# Patient Record
Sex: MALE | Race: WHITE | NOT HISPANIC OR LATINO | URBAN - METROPOLITAN AREA
[De-identification: names, ages, dates, MRNs, and addresses within clinical notes are randomized per-mention and may not be internally consistent; named-entity substitution may affect disease eponyms.]

---

## 2019-01-15 ENCOUNTER — EMERGENCY (EMERGENCY)
Facility: HOSPITAL | Age: 79
LOS: 1 days | Discharge: HOME | End: 2019-01-15
Attending: EMERGENCY MEDICINE
Payer: SELF-PAY

## 2019-01-15 VITALS
RESPIRATION RATE: 18 BRPM | DIASTOLIC BLOOD PRESSURE: 75 MMHG | WEIGHT: 253.97 LBS | SYSTOLIC BLOOD PRESSURE: 167 MMHG | HEIGHT: 65 IN | OXYGEN SATURATION: 97 % | TEMPERATURE: 98 F | HEART RATE: 92 BPM

## 2019-01-15 VITALS
TEMPERATURE: 98 F | RESPIRATION RATE: 18 BRPM | SYSTOLIC BLOOD PRESSURE: 126 MMHG | HEART RATE: 84 BPM | DIASTOLIC BLOOD PRESSURE: 67 MMHG | OXYGEN SATURATION: 96 %

## 2019-01-15 DIAGNOSIS — Z96.60 PRESENCE OF UNSPECIFIED ORTHOPEDIC JOINT IMPLANT: Chronic | ICD-10-CM

## 2019-01-15 DIAGNOSIS — Z95.5 PRESENCE OF CORONARY ANGIOPLASTY IMPLANT AND GRAFT: Chronic | ICD-10-CM

## 2019-01-15 PROCEDURE — 73590 X-RAY EXAM OF LOWER LEG: CPT | Mod: 26,RT

## 2019-01-15 PROCEDURE — 90471 IMMUNIZATION ADMIN: CPT

## 2019-01-15 PROCEDURE — 73590 X-RAY EXAM OF LOWER LEG: CPT

## 2019-01-15 PROCEDURE — 99283 EMERGENCY DEPT VISIT LOW MDM: CPT | Mod: 25

## 2019-01-15 PROCEDURE — 99283 EMERGENCY DEPT VISIT LOW MDM: CPT

## 2019-01-15 PROCEDURE — 90715 TDAP VACCINE 7 YRS/> IM: CPT

## 2019-01-15 RX ORDER — TETANUS TOXOID, REDUCED DIPHTHERIA TOXOID AND ACELLULAR PERTUSSIS VACCINE, ADSORBED 5; 2.5; 8; 8; 2.5 [IU]/.5ML; [IU]/.5ML; UG/.5ML; UG/.5ML; UG/.5ML
0.5 SUSPENSION INTRAMUSCULAR ONCE
Qty: 0 | Refills: 0 | Status: COMPLETED | OUTPATIENT
Start: 2019-01-15 | End: 2019-01-15

## 2019-01-15 RX ORDER — ACETAMINOPHEN 500 MG
975 TABLET ORAL ONCE
Qty: 0 | Refills: 0 | Status: COMPLETED | OUTPATIENT
Start: 2019-01-15 | End: 2019-01-15

## 2019-01-15 RX ADMIN — Medication 975 MILLIGRAM(S): at 17:16

## 2019-01-15 RX ADMIN — TETANUS TOXOID, REDUCED DIPHTHERIA TOXOID AND ACELLULAR PERTUSSIS VACCINE, ADSORBED 0.5 MILLILITER(S): 5; 2.5; 8; 8; 2.5 SUSPENSION INTRAMUSCULAR at 18:05
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
## 2019-01-15 NOTE — ED PROVIDER NOTE - CARE PROVIDER_API CALL
Huyen Amezcua (MD), Orthopaedic Surgery  [address] [place] [id number]  Phone: [phone]  Fax: [phone]
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
## 2019-07-16 ENCOUNTER — TRANSCRIPTION ENCOUNTER (OUTPATIENT)
Age: 79
End: 2019-07-16

## 2019-07-16 NOTE — ASU PATIENT PROFILE, ADULT - LANGUAGE ASSISTANCE NEEDED
Yes-Patient/Caregiver accepts free interpretation services.../patient additonally request that son Amir provide interpreation

## 2019-07-16 NOTE — ASU PATIENT PROFILE, ADULT - PMH
BPH (benign prostatic hyperplasia)    CAD (coronary artery disease)    Diabetes    Gout    Hyperlipidemia    Hypertension

## 2019-07-16 NOTE — ASU PATIENT PROFILE, ADULT - PSH
Fracture, ankle  left  S/P hip replacement  right  S/P primary angioplasty with coronary stent  4/2013- one stent

## 2019-07-17 ENCOUNTER — OUTPATIENT (OUTPATIENT)
Dept: OUTPATIENT SERVICES | Facility: HOSPITAL | Age: 79
LOS: 1 days | Discharge: HOME | End: 2019-07-17
Payer: MEDICARE

## 2019-07-17 VITALS
WEIGHT: 242.95 LBS | RESPIRATION RATE: 21 BRPM | DIASTOLIC BLOOD PRESSURE: 76 MMHG | TEMPERATURE: 99 F | OXYGEN SATURATION: 93 % | HEART RATE: 65 BPM | SYSTOLIC BLOOD PRESSURE: 128 MMHG | HEIGHT: 69 IN

## 2019-07-17 VITALS
DIASTOLIC BLOOD PRESSURE: 62 MMHG | RESPIRATION RATE: 17 BRPM | OXYGEN SATURATION: 96 % | SYSTOLIC BLOOD PRESSURE: 133 MMHG | HEART RATE: 60 BPM

## 2019-07-17 DIAGNOSIS — Z96.60 PRESENCE OF UNSPECIFIED ORTHOPEDIC JOINT IMPLANT: Chronic | ICD-10-CM

## 2019-07-17 DIAGNOSIS — H25.12 AGE-RELATED NUCLEAR CATARACT, LEFT EYE: ICD-10-CM

## 2019-07-17 DIAGNOSIS — S82.899A OTHER FRACTURE OF UNSPECIFIED LOWER LEG, INITIAL ENCOUNTER FOR CLOSED FRACTURE: Chronic | ICD-10-CM

## 2019-07-17 DIAGNOSIS — Z95.5 PRESENCE OF CORONARY ANGIOPLASTY IMPLANT AND GRAFT: Chronic | ICD-10-CM

## 2019-07-17 LAB — GLUCOSE BLDC GLUCOMTR-MCNC: 113 MG/DL — HIGH (ref 70–99)

## 2019-07-17 PROCEDURE — 66984 XCAPSL CTRC RMVL W/O ECP: CPT | Mod: LT

## 2019-07-17 PROCEDURE — V2632: CPT

## 2019-07-17 PROCEDURE — 82962 GLUCOSE BLOOD TEST: CPT

## 2019-07-17 NOTE — ASU DISCHARGE PLAN (ADULT/PEDIATRIC) - ASU DC SPECIAL INSTRUCTIONSFT
do not rub eye. tylenol for discomfort.  avoid advil motrin aleve aspirin to decrease chance of bleeding. eye kit given to pt.  Discharge and follow up  instructions explained to pt.  pt understands proper use of eye drops and instructions. [facility]

## 2019-07-17 NOTE — ASU DISCHARGE PLAN (ADULT/PEDIATRIC) - PATIENT EDUCATION MATERIALS PROVIED
Implant card (specify)/Other (specify)/Intraocular lens implant(IOL), Eye shield with instructions , sunglasses and eye kit given to patient.

## 2019-07-17 NOTE — ASU DISCHARGE PLAN (ADULT/PEDIATRIC) - CALL YOUR DOCTOR IF YOU HAVE ANY OF THE FOLLOWING:
Bleeding that does not stop/Swelling that gets worse/Nausea and vomiting that does not stop/Pain not relieved by Medications/Fever greater than (need to indicate Fahrenheit or Celsius) Bleeding that does not stop/Inability to tolerate liquids or foods/Nausea and vomiting that does not stop/Pain not relieved by Medications/Swelling that gets worse/Fever greater than (need to indicate Fahrenheit or Celsius)

## 2019-07-17 NOTE — ASU DISCHARGE PLAN (ADULT/PEDIATRIC) - CARE PROVIDER_API CALL
Ky Heaton)  Ophthalmology  [address] [place] [id number]  Phone: [phone]  Fax: [phone]  Follow Up Time:

## 2019-10-29 ENCOUNTER — TRANSCRIPTION ENCOUNTER (OUTPATIENT)
Age: 79
End: 2019-10-29

## 2019-10-29 PROBLEM — M10.9 GOUT, UNSPECIFIED: Chronic | Status: ACTIVE | Noted: 2019-07-17

## 2019-10-29 PROBLEM — N40.0 BENIGN PROSTATIC HYPERPLASIA WITHOUT LOWER URINARY TRACT SYMPTOMS: Chronic | Status: ACTIVE | Noted: 2019-07-17

## 2019-10-30 ENCOUNTER — OUTPATIENT (OUTPATIENT)
Dept: OUTPATIENT SERVICES | Facility: HOSPITAL | Age: 79
LOS: 1 days | End: 2019-10-30
Payer: MEDICARE

## 2019-10-30 VITALS
HEART RATE: 60 BPM | SYSTOLIC BLOOD PRESSURE: 141 MMHG | DIASTOLIC BLOOD PRESSURE: 74 MMHG | OXYGEN SATURATION: 95 % | HEIGHT: 65 IN | WEIGHT: 240.3 LBS | TEMPERATURE: 98 F | RESPIRATION RATE: 20 BRPM

## 2019-10-30 VITALS
OXYGEN SATURATION: 97 % | SYSTOLIC BLOOD PRESSURE: 133 MMHG | DIASTOLIC BLOOD PRESSURE: 59 MMHG | RESPIRATION RATE: 20 BRPM | HEART RATE: 63 BPM

## 2019-10-30 DIAGNOSIS — Z95.5 PRESENCE OF CORONARY ANGIOPLASTY IMPLANT AND GRAFT: Chronic | ICD-10-CM

## 2019-10-30 DIAGNOSIS — Z98.49 CATARACT EXTRACTION STATUS, UNSPECIFIED EYE: Chronic | ICD-10-CM

## 2019-10-30 DIAGNOSIS — S82.899A OTHER FRACTURE OF UNSPECIFIED LOWER LEG, INITIAL ENCOUNTER FOR CLOSED FRACTURE: Chronic | ICD-10-CM

## 2019-10-30 DIAGNOSIS — Z96.60 PRESENCE OF UNSPECIFIED ORTHOPEDIC JOINT IMPLANT: Chronic | ICD-10-CM

## 2019-10-30 DIAGNOSIS — H25.811 COMBINED FORMS OF AGE-RELATED CATARACT, RIGHT EYE: ICD-10-CM

## 2019-10-30 LAB — GLUCOSE BLDC GLUCOMTR-MCNC: 109 MG/DL — HIGH (ref 70–99)

## 2019-10-30 PROCEDURE — 66984 XCAPSL CTRC RMVL W/O ECP: CPT | Mod: RT

## 2019-10-30 PROCEDURE — V2632: CPT

## 2019-10-30 PROCEDURE — 82962 GLUCOSE BLOOD TEST: CPT

## 2019-10-30 NOTE — ASU PATIENT PROFILE, ADULT - LANGUAGE ASSISTANCE NEEDED
patient additonally request that son Amir provide interpreation/Yes-Patient/Caregiver accepts free interpretation services... patient additonally request that vera Oseguera assist with translation/Yes-Patient/Caregiver accepts free interpretation services...

## 2019-10-30 NOTE — ASU DISCHARGE PLAN (ADULT/PEDIATRIC) - CARE PROVIDER_API CALL
Ky Heaton)  Ophthalmology  [address] [place] [id number]  Phone: [phone]  Fax: [phone]  Follow Up Time:

## 2019-10-30 NOTE — ASU DISCHARGE PLAN (ADULT/PEDIATRIC) - CALL YOUR DOCTOR IF YOU HAVE ANY OF THE FOLLOWING:
Bleeding that does not stop/Pain not relieved by Medications/Swelling that gets worse/Wound/Surgical Site with redness, or foul smelling discharge or pus/Nausea and vomiting that does not stop/Fever greater than (need to indicate Fahrenheit or Celsius)

## 2019-10-30 NOTE — ASU PATIENT PROFILE, ADULT - PSH
Fracture, ankle  left  S/P hip replacement  right  S/P primary angioplasty with coronary stent  4/2013- one stent  Status post cataract extraction  left eye done on 7/17/2019

## 2022-01-26 ENCOUNTER — RESULT REVIEW (OUTPATIENT)
Age: 82
End: 2022-01-26

## 2022-06-23 ENCOUNTER — APPOINTMENT (OUTPATIENT)
Dept: UROLOGY | Facility: CLINIC | Age: 82
End: 2022-06-23
Payer: MEDICARE

## 2022-06-23 VITALS
SYSTOLIC BLOOD PRESSURE: 195 MMHG | HEART RATE: 86 BPM | DIASTOLIC BLOOD PRESSURE: 88 MMHG | TEMPERATURE: 98.7 F | OXYGEN SATURATION: 95 %

## 2022-06-23 DIAGNOSIS — N20.0 CALCULUS OF KIDNEY: ICD-10-CM

## 2022-06-23 DIAGNOSIS — N40.1 BENIGN PROSTATIC HYPERPLASIA WITH LOWER URINARY TRACT SYMPMS: ICD-10-CM

## 2022-06-23 DIAGNOSIS — N13.8 BENIGN PROSTATIC HYPERPLASIA WITH LOWER URINARY TRACT SYMPMS: ICD-10-CM

## 2022-06-23 PROCEDURE — 99204 OFFICE O/P NEW MOD 45 MIN: CPT

## 2022-06-23 PROCEDURE — 51798 US URINE CAPACITY MEASURE: CPT

## 2022-06-23 RX ORDER — HYDROCHLOROTHIAZIDE 25 MG/1
25 TABLET ORAL
Qty: 90 | Refills: 0 | Status: ACTIVE | COMMUNITY
Start: 2022-04-08

## 2022-06-23 RX ORDER — AMMONIUM LACTATE 12 %
12 CREAM (GRAM) TOPICAL
Qty: 280 | Refills: 0 | Status: ACTIVE | COMMUNITY
Start: 2022-03-03

## 2022-06-23 RX ORDER — DONEPEZIL HYDROCHLORIDE 5 MG/1
5 TABLET ORAL
Qty: 90 | Refills: 0 | Status: ACTIVE | COMMUNITY
Start: 2022-06-15

## 2022-06-23 RX ORDER — NAPROXEN 500 MG/1
500 TABLET ORAL
Qty: 30 | Refills: 0 | Status: ACTIVE | COMMUNITY
Start: 2022-04-08

## 2022-06-23 RX ORDER — SULFAMETHOXAZOLE AND TRIMETHOPRIM 800; 160 MG/1; MG/1
800-160 TABLET ORAL
Qty: 14 | Refills: 0 | Status: ACTIVE | COMMUNITY
Start: 2022-01-31

## 2022-06-23 RX ORDER — SEMAGLUTIDE 1.34 MG/ML
2 INJECTION, SOLUTION SUBCUTANEOUS
Qty: 2 | Refills: 0 | Status: ACTIVE | COMMUNITY
Start: 2022-06-09

## 2022-06-23 RX ORDER — BLOOD SUGAR DIAGNOSTIC
STRIP MISCELLANEOUS
Qty: 200 | Refills: 0 | Status: ACTIVE | COMMUNITY
Start: 2022-01-25

## 2022-06-23 RX ORDER — METOPROLOL SUCCINATE 100 MG/1
100 TABLET, EXTENDED RELEASE ORAL
Qty: 180 | Refills: 0 | Status: ACTIVE | COMMUNITY
Start: 2022-04-08

## 2022-06-23 RX ORDER — CLOPIDOGREL BISULFATE 75 MG/1
75 TABLET, FILM COATED ORAL
Qty: 90 | Refills: 0 | Status: ACTIVE | COMMUNITY
Start: 2022-04-08

## 2022-06-23 RX ORDER — LOSARTAN POTASSIUM 100 MG/1
100 TABLET, FILM COATED ORAL
Qty: 90 | Refills: 0 | Status: ACTIVE | COMMUNITY
Start: 2022-04-08

## 2022-06-23 RX ORDER — METOLAZONE 5 MG/1
5 TABLET ORAL
Qty: 90 | Refills: 0 | Status: ACTIVE | COMMUNITY
Start: 2022-06-09

## 2022-06-23 RX ORDER — OXYBUTYNIN CHLORIDE 5 MG/1
5 TABLET, EXTENDED RELEASE ORAL
Qty: 90 | Refills: 3 | Status: ACTIVE | COMMUNITY
Start: 2022-06-23 | End: 1900-01-01

## 2022-06-23 RX ORDER — TAMSULOSIN HYDROCHLORIDE 0.4 MG/1
0.4 CAPSULE ORAL
Qty: 90 | Refills: 0 | Status: ACTIVE | COMMUNITY
Start: 2022-06-15

## 2022-06-23 RX ORDER — HYDROXYZINE HYDROCHLORIDE 10 MG/1
10 TABLET ORAL
Qty: 90 | Refills: 0 | Status: ACTIVE | COMMUNITY
Start: 2022-04-08

## 2022-06-23 RX ORDER — GLYBURIDE AND METFORMIN HYDROCHLORIDE 5; 500 MG/1; MG/1
5-500 TABLET, FILM COATED ORAL
Qty: 360 | Refills: 0 | Status: ACTIVE | COMMUNITY
Start: 2022-04-08

## 2022-06-23 RX ORDER — ALLOPURINOL 300 MG/1
300 TABLET ORAL
Qty: 90 | Refills: 0 | Status: ACTIVE | COMMUNITY
Start: 2022-04-08

## 2022-06-23 RX ORDER — GABAPENTIN 300 MG/1
300 CAPSULE ORAL
Qty: 90 | Refills: 0 | Status: ACTIVE | COMMUNITY
Start: 2022-04-08

## 2022-06-23 RX ORDER — INSULIN DEGLUDEC INJECTION 200 U/ML
200 INJECTION, SOLUTION SUBCUTANEOUS
Qty: 27 | Refills: 0 | Status: ACTIVE | COMMUNITY
Start: 2022-06-11

## 2022-06-23 RX ORDER — TAMSULOSIN HYDROCHLORIDE 0.4 MG/1
0.4 CAPSULE ORAL
Qty: 90 | Refills: 3 | Status: ACTIVE | COMMUNITY
Start: 2022-06-23 | End: 1900-01-01

## 2022-06-23 RX ORDER — ESOMEPRAZOLE MAGNESIUM 40 MG/1
40 CAPSULE, DELAYED RELEASE ORAL
Qty: 90 | Refills: 0 | Status: ACTIVE | COMMUNITY
Start: 2022-04-08

## 2022-06-23 RX ORDER — DICLOFENAC SODIUM 1% 10 MG/G
1 GEL TOPICAL
Qty: 100 | Refills: 0 | Status: ACTIVE | COMMUNITY
Start: 2022-06-09

## 2022-06-23 RX ORDER — TRIAMCINOLONE ACETONIDE 0.25 MG/G
0.03 CREAM TOPICAL
Qty: 45 | Refills: 0 | Status: ACTIVE | COMMUNITY
Start: 2022-05-10

## 2022-06-23 RX ORDER — LANCETS
EACH MISCELLANEOUS
Qty: 200 | Refills: 0 | Status: ACTIVE | COMMUNITY
Start: 2022-01-25

## 2022-06-23 NOTE — HISTORY OF PRESENT ILLNESS
[Urinary Incontinence] : urinary incontinence [Urinary Urgency] : urinary urgency [FreeTextEntry1] : CHETNA MENDOZA is a 82 year M who presents for ~ 2 year history of urinary incontinence, requiring diapers. Leaks when he's walking, sitting- wears a diaper outside, but does have some recognized urge when home that he can rush to the bathroom for.\par \par No dysuria, no hematuria. No flank or abdominal pain.\par \par PMH: dm, gout, htn, CAD, kidney stones\par PSH: hip replacement, skin cancer removal\par FH: no sig FH gu issues\par Meds: mult- see full list, reviewed\par SH: never a smoker\par Allergies: nkda\par

## 2022-06-23 NOTE — ASSESSMENT
[FreeTextEntry1] : No urge, unable to provide urine at this time.\par \par Bladder scan: 36 cc random value volume;\par \par I had a long discussion with the patient about his voiding symptoms, bladder emptying, and medical  treatments for BP and OAB, and further evaluation with cystoscopy. Also, need to obtain u/a, culture which he can do as outpt from home.\par \par Based on his symptoms, exam, lab values, and procedural findings, I've recommended:\par \par The pt desires to proceed with: tamsulosin for any BPH and to maximize emptying, and oxybutynin 5 mg ER to start  ~ 1 week later, both once day\par \par Will arrange followup as follows-\par \par 1. RTC 3 weeks with u/a, bladder scan\par 2. u/a, C&S when able

## 2022-06-23 NOTE — PHYSICAL EXAM
[General Appearance - Well Developed] : well developed [General Appearance - Well Nourished] : well nourished [Normal Appearance] : normal appearance [Well Groomed] : well groomed [General Appearance - In No Acute Distress] : no acute distress [Edema] : no peripheral edema [Exaggerated Use Of Accessory Muscles For Inspiration] : no accessory muscle use [Abdomen Soft] : soft [Abdomen Tenderness] : non-tender [Costovertebral Angle Tenderness] : no ~M costovertebral angle tenderness [Urethral Meatus] : meatus normal [Penis Abnormality] : normal circumcised penis [Urinary Bladder Findings] : the bladder was normal on palpation [Epididymis] : the epididymides were normal [Testes Mass (___cm)] : there were no testicular masses [Normal Station and Gait] : the gait and station were normal for the patient's age [FreeTextEntry1] : needs walker assist [] : no rash [No Focal Deficits] : no focal deficits [Oriented To Time, Place, And Person] : oriented to person, place, and time [Affect] : the affect was normal [Mood] : the mood was normal [Not Anxious] : not anxious

## 2022-06-25 LAB
APPEARANCE: ABNORMAL
BACTERIA: ABNORMAL
BILIRUBIN URINE: NEGATIVE
BLOOD URINE: NEGATIVE
COLOR: YELLOW
GLUCOSE QUALITATIVE U: NEGATIVE
HYALINE CASTS: 0 /LPF
KETONES URINE: NEGATIVE
LEUKOCYTE ESTERASE URINE: NEGATIVE
MICROSCOPIC-UA: NORMAL
NITRITE URINE: NEGATIVE
PH URINE: 6.5
PROTEIN URINE: ABNORMAL
RED BLOOD CELLS URINE: 2 /HPF
SPECIFIC GRAVITY URINE: 1.02
SQUAMOUS EPITHELIAL CELLS: 0 /HPF
UROBILINOGEN URINE: NORMAL
WHITE BLOOD CELLS URINE: 1 /HPF

## 2022-06-28 LAB — BACTERIA UR CULT: NORMAL

## 2022-07-14 ENCOUNTER — APPOINTMENT (OUTPATIENT)
Dept: UROLOGY | Facility: CLINIC | Age: 82
End: 2022-07-14

## 2022-07-14 DIAGNOSIS — R32 UNSPECIFIED URINARY INCONTINENCE: ICD-10-CM

## 2022-07-14 DIAGNOSIS — N32.81 OVERACTIVE BLADDER: ICD-10-CM

## 2023-06-03 ENCOUNTER — INPATIENT (INPATIENT)
Facility: HOSPITAL | Age: 83
LOS: 5 days | Discharge: NURSING, REHAB OR LONG-TERM CARE | DRG: 602 | End: 2023-06-09
Attending: INTERNAL MEDICINE | Admitting: STUDENT IN AN ORGANIZED HEALTH CARE EDUCATION/TRAINING PROGRAM
Payer: MEDICARE

## 2023-06-03 VITALS
SYSTOLIC BLOOD PRESSURE: 150 MMHG | TEMPERATURE: 98 F | WEIGHT: 244.93 LBS | OXYGEN SATURATION: 94 % | HEIGHT: 68 IN | DIASTOLIC BLOOD PRESSURE: 61 MMHG | RESPIRATION RATE: 22 BRPM | HEART RATE: 71 BPM

## 2023-06-03 DIAGNOSIS — Z98.49 CATARACT EXTRACTION STATUS, UNSPECIFIED EYE: Chronic | ICD-10-CM

## 2023-06-03 DIAGNOSIS — L03.90 CELLULITIS, UNSPECIFIED: ICD-10-CM

## 2023-06-03 DIAGNOSIS — Z96.60 PRESENCE OF UNSPECIFIED ORTHOPEDIC JOINT IMPLANT: Chronic | ICD-10-CM

## 2023-06-03 DIAGNOSIS — Z95.5 PRESENCE OF CORONARY ANGIOPLASTY IMPLANT AND GRAFT: Chronic | ICD-10-CM

## 2023-06-03 DIAGNOSIS — S82.899A OTHER FRACTURE OF UNSPECIFIED LOWER LEG, INITIAL ENCOUNTER FOR CLOSED FRACTURE: Chronic | ICD-10-CM

## 2023-06-03 LAB
ALBUMIN SERPL ELPH-MCNC: 4.4 G/DL — SIGNIFICANT CHANGE UP (ref 3.3–5)
ALP SERPL-CCNC: 107 U/L — SIGNIFICANT CHANGE UP (ref 40–120)
ALT FLD-CCNC: 33 U/L — SIGNIFICANT CHANGE UP (ref 10–45)
ANION GAP SERPL CALC-SCNC: 11 MMOL/L — SIGNIFICANT CHANGE UP (ref 5–17)
AST SERPL-CCNC: 28 U/L — SIGNIFICANT CHANGE UP (ref 10–40)
BASOPHILS # BLD AUTO: 0.07 K/UL — SIGNIFICANT CHANGE UP (ref 0–0.2)
BASOPHILS NFR BLD AUTO: 0.8 % — SIGNIFICANT CHANGE UP (ref 0–2)
BILIRUB SERPL-MCNC: 0.2 MG/DL — SIGNIFICANT CHANGE UP (ref 0.2–1.2)
BUN SERPL-MCNC: 17 MG/DL — SIGNIFICANT CHANGE UP (ref 7–23)
CALCIUM SERPL-MCNC: 9.8 MG/DL — SIGNIFICANT CHANGE UP (ref 8.4–10.5)
CHLORIDE SERPL-SCNC: 99 MMOL/L — SIGNIFICANT CHANGE UP (ref 96–108)
CO2 SERPL-SCNC: 29 MMOL/L — SIGNIFICANT CHANGE UP (ref 22–31)
CREAT SERPL-MCNC: 0.79 MG/DL — SIGNIFICANT CHANGE UP (ref 0.5–1.3)
EGFR: 88 ML/MIN/1.73M2 — SIGNIFICANT CHANGE UP
EOSINOPHIL # BLD AUTO: 0.28 K/UL — SIGNIFICANT CHANGE UP (ref 0–0.5)
EOSINOPHIL NFR BLD AUTO: 3.3 % — SIGNIFICANT CHANGE UP (ref 0–6)
FLUAV AG NPH QL: SIGNIFICANT CHANGE UP
FLUBV AG NPH QL: SIGNIFICANT CHANGE UP
GLUCOSE SERPL-MCNC: 96 MG/DL — SIGNIFICANT CHANGE UP (ref 70–99)
HCT VFR BLD CALC: 37.7 % — LOW (ref 39–50)
HGB BLD-MCNC: 12.6 G/DL — LOW (ref 13–17)
IMM GRANULOCYTES NFR BLD AUTO: 0.6 % — SIGNIFICANT CHANGE UP (ref 0–0.9)
LYMPHOCYTES # BLD AUTO: 2.51 K/UL — SIGNIFICANT CHANGE UP (ref 1–3.3)
LYMPHOCYTES # BLD AUTO: 29.5 % — SIGNIFICANT CHANGE UP (ref 13–44)
MCHC RBC-ENTMCNC: 32.8 PG — SIGNIFICANT CHANGE UP (ref 27–34)
MCHC RBC-ENTMCNC: 33.4 GM/DL — SIGNIFICANT CHANGE UP (ref 32–36)
MCV RBC AUTO: 98.2 FL — SIGNIFICANT CHANGE UP (ref 80–100)
MONOCYTES # BLD AUTO: 0.85 K/UL — SIGNIFICANT CHANGE UP (ref 0–0.9)
MONOCYTES NFR BLD AUTO: 10 % — SIGNIFICANT CHANGE UP (ref 2–14)
NEUTROPHILS # BLD AUTO: 4.74 K/UL — SIGNIFICANT CHANGE UP (ref 1.8–7.4)
NEUTROPHILS NFR BLD AUTO: 55.8 % — SIGNIFICANT CHANGE UP (ref 43–77)
NRBC # BLD: 0 /100 WBCS — SIGNIFICANT CHANGE UP (ref 0–0)
NT-PROBNP SERPL-SCNC: <36 PG/ML — SIGNIFICANT CHANGE UP (ref 0–300)
PLATELET # BLD AUTO: 269 K/UL — SIGNIFICANT CHANGE UP (ref 150–400)
POTASSIUM SERPL-MCNC: 4.3 MMOL/L — SIGNIFICANT CHANGE UP (ref 3.5–5.3)
POTASSIUM SERPL-SCNC: 4.3 MMOL/L — SIGNIFICANT CHANGE UP (ref 3.5–5.3)
PROT SERPL-MCNC: 7.8 G/DL — SIGNIFICANT CHANGE UP (ref 6–8.3)
RBC # BLD: 3.84 M/UL — LOW (ref 4.2–5.8)
RBC # FLD: 13.1 % — SIGNIFICANT CHANGE UP (ref 10.3–14.5)
RSV RNA NPH QL NAA+NON-PROBE: SIGNIFICANT CHANGE UP
SARS-COV-2 RNA SPEC QL NAA+PROBE: SIGNIFICANT CHANGE UP
SODIUM SERPL-SCNC: 139 MMOL/L — SIGNIFICANT CHANGE UP (ref 135–145)
TROPONIN T, HIGH SENSITIVITY RESULT: 25 NG/L — SIGNIFICANT CHANGE UP (ref 0–51)
WBC # BLD: 8.5 K/UL — SIGNIFICANT CHANGE UP (ref 3.8–10.5)
WBC # FLD AUTO: 8.5 K/UL — SIGNIFICANT CHANGE UP (ref 3.8–10.5)

## 2023-06-03 PROCEDURE — 71045 X-RAY EXAM CHEST 1 VIEW: CPT | Mod: 26

## 2023-06-03 PROCEDURE — 99285 EMERGENCY DEPT VISIT HI MDM: CPT

## 2023-06-03 RX ORDER — CEFTRIAXONE 500 MG/1
1000 INJECTION, POWDER, FOR SOLUTION INTRAMUSCULAR; INTRAVENOUS ONCE
Refills: 0 | Status: COMPLETED | OUTPATIENT
Start: 2023-06-03 | End: 2023-06-03

## 2023-06-03 RX ADMIN — CEFTRIAXONE 100 MILLIGRAM(S): 500 INJECTION, POWDER, FOR SOLUTION INTRAMUSCULAR; INTRAVENOUS at 22:42

## 2023-06-03 NOTE — ED PROVIDER NOTE - NSICDXPASTSURGICALHX_GEN_ALL_CORE_FT
PAST SURGICAL HISTORY:  Fracture, ankle left    S/P hip replacement right    S/P primary angioplasty with coronary stent 4/2013- one stent    Status post cataract extraction left eye done on 7/17/2019

## 2023-06-03 NOTE — ED PROVIDER NOTE - NSICDXPASTMEDICALHX_GEN_ALL_CORE_FT
PAST MEDICAL HISTORY:  BPH (benign prostatic hyperplasia)     CAD (coronary artery disease)     Diabetes     Gout     Hyperlipidemia     Hypertension

## 2023-06-03 NOTE — ED ADULT NURSE REASSESSMENT NOTE - NS ED NURSE REASSESS COMMENT FT1
report received from THUY wade. pt admitted for antibiotics. pt A&Ox4 and vss. as per MD pt ok ot eat provided snacks

## 2023-06-03 NOTE — ED PROVIDER NOTE - CARE PLAN
1 Principal Discharge DX:	Cellulitis  Secondary Diagnosis:	Lower extremity edema  Secondary Diagnosis:	Lower extremity edema   FAMILY HISTORY:  Father  Still living? Unknown  Family history of heart attack, Age at diagnosis: Age Unknown    Mother  Still living? Unknown  Family history of Alzheimer's disease, Age at diagnosis: Age Unknown

## 2023-06-03 NOTE — ED ADULT TRIAGE NOTE - CHIEF COMPLAINT QUOTE
right le redness and swelling; no trauma; primary doctor gave ointment 3 days ago; not getting better

## 2023-06-03 NOTE — ED PROVIDER NOTE - NS ED MD DISPO ADMITTING SERVICE
Pt does not report following any specialized diet PTA. Pt does not report following any specialized diet PTA. Drinks Ensure occasionally when she skips a meal MED

## 2023-06-03 NOTE — ED PROVIDER NOTE - PROGRESS NOTE DETAILS
Patient reevaluated, labs remarkable for normal CBC, normal CMP and negative COVID.  Chest x-ray with mild bilateral pulm edema.  In the setting of lower extremity edema, concern for new onset heart failure.  No fluid overload currently will defer diuretics.  Discussed with hospitalist, will admit patient to telemetry.  Antibiotics ordered for lower extremity cellulitis

## 2023-06-03 NOTE — ED PROCEDURE NOTE - ATTENDING CONTRIBUTION TO CARE
Emergency Department Focused Ultrasound performed at patient's bedside for educational purposes. The study will have a follow up study performed or was performed in the direct supervision of an ultrasound trained attending.     near normal EF, no pericard effusion, no b lines , poor echo windows
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
## 2023-06-03 NOTE — ED PROVIDER NOTE - OBJECTIVE STATEMENT
83-year-old male Kyrgyz speaking history of diabetes on oral as well as insulin medication presenting with right lower extremity redness and bilateral lower extremity swelling son is accompanying him and says that he has been not feeling well for past few days and has been feeling short of breath progressively denies chest pain right now or palpitations denies recent antibiotic use.  Noticed some weeping from the small ulcer that he has over the anterior shin of the right leg denies any fever chills no nausea vomiting and no change in bowel habits or urinary symptoms

## 2023-06-03 NOTE — ED PROVIDER NOTE - PHYSICAL EXAMINATION
Well-appearing mild respiratory distress and tachypnea  Neuro exam grossly intact hard of hearing  Clear lungs  S1-S2  Soft nontender abdomen  Well-perfused  Bilateral pitting edema of both legs up to the knee right slightly more than left as well as an area of erythema in the mid anterior right leg with a central wound slightly warm and tender to touch

## 2023-06-03 NOTE — ED ADULT NURSE NOTE - OBJECTIVE STATEMENT
84 yo M pt hx of HLD, DM, HTN with son at bedside Farsi speaking p/w a few days of B/L LE edema and wound to R shin. pt seen by PCP started on Santyl ointment with minimal relief.  pt A&Ox4, BOOGIE, skin warm dry with b/l LE edema +3 pitting, RLE erythematous around wound and warm to touch, PMS intact.  Pt denies headache, dizziness, chest pain, palpitations, cough, SOB, abdominal pain, n/v/d, urinary symptoms, fevers, chills, weakness at this time.

## 2023-06-04 ENCOUNTER — TRANSCRIPTION ENCOUNTER (OUTPATIENT)
Age: 83
End: 2023-06-04

## 2023-06-04 DIAGNOSIS — I25.10 ATHEROSCLEROTIC HEART DISEASE OF NATIVE CORONARY ARTERY WITHOUT ANGINA PECTORIS: ICD-10-CM

## 2023-06-04 DIAGNOSIS — E03.9 HYPOTHYROIDISM, UNSPECIFIED: ICD-10-CM

## 2023-06-04 DIAGNOSIS — M79.89 OTHER SPECIFIED SOFT TISSUE DISORDERS: ICD-10-CM

## 2023-06-04 DIAGNOSIS — Z29.9 ENCOUNTER FOR PROPHYLACTIC MEASURES, UNSPECIFIED: ICD-10-CM

## 2023-06-04 DIAGNOSIS — E11.9 TYPE 2 DIABETES MELLITUS WITHOUT COMPLICATIONS: ICD-10-CM

## 2023-06-04 DIAGNOSIS — M19.90 UNSPECIFIED OSTEOARTHRITIS, UNSPECIFIED SITE: ICD-10-CM

## 2023-06-04 DIAGNOSIS — I10 ESSENTIAL (PRIMARY) HYPERTENSION: ICD-10-CM

## 2023-06-04 DIAGNOSIS — L03.90 CELLULITIS, UNSPECIFIED: ICD-10-CM

## 2023-06-04 LAB
A1C WITH ESTIMATED AVERAGE GLUCOSE RESULT: 8.8 % — HIGH (ref 4–5.6)
ALBUMIN SERPL ELPH-MCNC: 3.9 G/DL — SIGNIFICANT CHANGE UP (ref 3.3–5)
ALP SERPL-CCNC: 95 U/L — SIGNIFICANT CHANGE UP (ref 40–120)
ALT FLD-CCNC: 28 U/L — SIGNIFICANT CHANGE UP (ref 10–45)
ANION GAP SERPL CALC-SCNC: 15 MMOL/L — SIGNIFICANT CHANGE UP (ref 5–17)
AST SERPL-CCNC: 24 U/L — SIGNIFICANT CHANGE UP (ref 10–40)
BASOPHILS # BLD AUTO: 0.06 K/UL — SIGNIFICANT CHANGE UP (ref 0–0.2)
BASOPHILS NFR BLD AUTO: 0.8 % — SIGNIFICANT CHANGE UP (ref 0–2)
BILIRUB SERPL-MCNC: 0.3 MG/DL — SIGNIFICANT CHANGE UP (ref 0.2–1.2)
BUN SERPL-MCNC: 15 MG/DL — SIGNIFICANT CHANGE UP (ref 7–23)
CALCIUM SERPL-MCNC: 9 MG/DL — SIGNIFICANT CHANGE UP (ref 8.4–10.5)
CHLORIDE SERPL-SCNC: 98 MMOL/L — SIGNIFICANT CHANGE UP (ref 96–108)
CO2 SERPL-SCNC: 26 MMOL/L — SIGNIFICANT CHANGE UP (ref 22–31)
CREAT SERPL-MCNC: 0.68 MG/DL — SIGNIFICANT CHANGE UP (ref 0.5–1.3)
EGFR: 92 ML/MIN/1.73M2 — SIGNIFICANT CHANGE UP
EOSINOPHIL # BLD AUTO: 0.36 K/UL — SIGNIFICANT CHANGE UP (ref 0–0.5)
EOSINOPHIL NFR BLD AUTO: 4.5 % — SIGNIFICANT CHANGE UP (ref 0–6)
ESTIMATED AVERAGE GLUCOSE: 206 MG/DL — HIGH (ref 68–114)
GLUCOSE BLDC GLUCOMTR-MCNC: 102 MG/DL — HIGH (ref 70–99)
GLUCOSE BLDC GLUCOMTR-MCNC: 126 MG/DL — HIGH (ref 70–99)
GLUCOSE BLDC GLUCOMTR-MCNC: 135 MG/DL — HIGH (ref 70–99)
GLUCOSE BLDC GLUCOMTR-MCNC: 157 MG/DL — HIGH (ref 70–99)
GLUCOSE BLDC GLUCOMTR-MCNC: 190 MG/DL — HIGH (ref 70–99)
GLUCOSE SERPL-MCNC: 110 MG/DL — HIGH (ref 70–99)
HCT VFR BLD CALC: 36.4 % — LOW (ref 39–50)
HGB BLD-MCNC: 12 G/DL — LOW (ref 13–17)
IMM GRANULOCYTES NFR BLD AUTO: 0.6 % — SIGNIFICANT CHANGE UP (ref 0–0.9)
LYMPHOCYTES # BLD AUTO: 2.36 K/UL — SIGNIFICANT CHANGE UP (ref 1–3.3)
LYMPHOCYTES # BLD AUTO: 29.8 % — SIGNIFICANT CHANGE UP (ref 13–44)
MAGNESIUM SERPL-MCNC: 1.6 MG/DL — SIGNIFICANT CHANGE UP (ref 1.6–2.6)
MCHC RBC-ENTMCNC: 32.6 PG — SIGNIFICANT CHANGE UP (ref 27–34)
MCHC RBC-ENTMCNC: 33 GM/DL — SIGNIFICANT CHANGE UP (ref 32–36)
MCV RBC AUTO: 98.9 FL — SIGNIFICANT CHANGE UP (ref 80–100)
MONOCYTES # BLD AUTO: 0.77 K/UL — SIGNIFICANT CHANGE UP (ref 0–0.9)
MONOCYTES NFR BLD AUTO: 9.7 % — SIGNIFICANT CHANGE UP (ref 2–14)
MRSA PCR RESULT.: SIGNIFICANT CHANGE UP
NEUTROPHILS # BLD AUTO: 4.33 K/UL — SIGNIFICANT CHANGE UP (ref 1.8–7.4)
NEUTROPHILS NFR BLD AUTO: 54.6 % — SIGNIFICANT CHANGE UP (ref 43–77)
NRBC # BLD: 0 /100 WBCS — SIGNIFICANT CHANGE UP (ref 0–0)
PHOSPHATE SERPL-MCNC: 3.7 MG/DL — SIGNIFICANT CHANGE UP (ref 2.5–4.5)
PLATELET # BLD AUTO: 247 K/UL — SIGNIFICANT CHANGE UP (ref 150–400)
POTASSIUM SERPL-MCNC: 3.7 MMOL/L — SIGNIFICANT CHANGE UP (ref 3.5–5.3)
POTASSIUM SERPL-SCNC: 3.7 MMOL/L — SIGNIFICANT CHANGE UP (ref 3.5–5.3)
PROT SERPL-MCNC: 7 G/DL — SIGNIFICANT CHANGE UP (ref 6–8.3)
RBC # BLD: 3.68 M/UL — LOW (ref 4.2–5.8)
RBC # FLD: 13.1 % — SIGNIFICANT CHANGE UP (ref 10.3–14.5)
S AUREUS DNA NOSE QL NAA+PROBE: SIGNIFICANT CHANGE UP
SODIUM SERPL-SCNC: 139 MMOL/L — SIGNIFICANT CHANGE UP (ref 135–145)
WBC # BLD: 7.93 K/UL — SIGNIFICANT CHANGE UP (ref 3.8–10.5)
WBC # FLD AUTO: 7.93 K/UL — SIGNIFICANT CHANGE UP (ref 3.8–10.5)

## 2023-06-04 PROCEDURE — 73590 X-RAY EXAM OF LOWER LEG: CPT | Mod: 26,RT

## 2023-06-04 PROCEDURE — 93970 EXTREMITY STUDY: CPT | Mod: 26

## 2023-06-04 PROCEDURE — 12345: CPT | Mod: NC,GC

## 2023-06-04 PROCEDURE — 99223 1ST HOSP IP/OBS HIGH 75: CPT | Mod: GC

## 2023-06-04 RX ORDER — HEPARIN SODIUM 5000 [USP'U]/ML
5000 INJECTION INTRAVENOUS; SUBCUTANEOUS EVERY 8 HOURS
Refills: 0 | Status: DISCONTINUED | OUTPATIENT
Start: 2023-06-04 | End: 2023-06-09

## 2023-06-04 RX ORDER — INSULIN LISPRO 100/ML
VIAL (ML) SUBCUTANEOUS
Refills: 0 | Status: DISCONTINUED | OUTPATIENT
Start: 2023-06-04 | End: 2023-06-09

## 2023-06-04 RX ORDER — INSULIN LISPRO 100/ML
VIAL (ML) SUBCUTANEOUS AT BEDTIME
Refills: 0 | Status: DISCONTINUED | OUTPATIENT
Start: 2023-06-04 | End: 2023-06-09

## 2023-06-04 RX ORDER — SODIUM CHLORIDE 9 MG/ML
1000 INJECTION, SOLUTION INTRAVENOUS
Refills: 0 | Status: DISCONTINUED | OUTPATIENT
Start: 2023-06-04 | End: 2023-06-09

## 2023-06-04 RX ORDER — FUROSEMIDE 40 MG
20 TABLET ORAL DAILY
Refills: 0 | Status: COMPLETED | OUTPATIENT
Start: 2023-06-04 | End: 2023-06-06

## 2023-06-04 RX ORDER — ASPIRIN/CALCIUM CARB/MAGNESIUM 324 MG
81 TABLET ORAL DAILY
Refills: 0 | Status: DISCONTINUED | OUTPATIENT
Start: 2023-06-04 | End: 2023-06-09

## 2023-06-04 RX ORDER — CLOPIDOGREL BISULFATE 75 MG/1
75 TABLET, FILM COATED ORAL DAILY
Refills: 0 | Status: DISCONTINUED | OUTPATIENT
Start: 2023-06-04 | End: 2023-06-06

## 2023-06-04 RX ORDER — FUROSEMIDE 40 MG
20 TABLET ORAL ONCE
Refills: 0 | Status: DISCONTINUED | OUTPATIENT
Start: 2023-06-04 | End: 2023-06-04

## 2023-06-04 RX ORDER — CHOLECALCIFEROL (VITAMIN D3) 125 MCG
1 CAPSULE ORAL
Refills: 0 | DISCHARGE

## 2023-06-04 RX ORDER — PANTOPRAZOLE SODIUM 20 MG/1
40 TABLET, DELAYED RELEASE ORAL
Refills: 0 | Status: DISCONTINUED | OUTPATIENT
Start: 2023-06-04 | End: 2023-06-09

## 2023-06-04 RX ORDER — METOPROLOL TARTRATE 50 MG
100 TABLET ORAL DAILY
Refills: 0 | Status: DISCONTINUED | OUTPATIENT
Start: 2023-06-04 | End: 2023-06-09

## 2023-06-04 RX ORDER — INSULIN GLARGINE 100 [IU]/ML
60 INJECTION, SOLUTION SUBCUTANEOUS AT BEDTIME
Refills: 0 | Status: DISCONTINUED | OUTPATIENT
Start: 2023-06-04 | End: 2023-06-08

## 2023-06-04 RX ORDER — INSULIN GLARGINE 100 [IU]/ML
60 INJECTION, SOLUTION SUBCUTANEOUS
Refills: 0 | DISCHARGE

## 2023-06-04 RX ORDER — ESOMEPRAZOLE MAGNESIUM 40 MG/1
1 CAPSULE, DELAYED RELEASE ORAL
Refills: 0 | DISCHARGE

## 2023-06-04 RX ORDER — HYDROXYZINE HCL 10 MG
10 TABLET ORAL DAILY
Refills: 0 | Status: DISCONTINUED | OUTPATIENT
Start: 2023-06-04 | End: 2023-06-06

## 2023-06-04 RX ORDER — GLYBURIDE-METFORMIN HYDROCHLORIDE 1.25; 25 MG/1; MG/1
1 TABLET ORAL
Refills: 0 | DISCHARGE

## 2023-06-04 RX ORDER — CEFTRIAXONE 500 MG/1
1000 INJECTION, POWDER, FOR SOLUTION INTRAMUSCULAR; INTRAVENOUS EVERY 24 HOURS
Refills: 0 | Status: DISCONTINUED | OUTPATIENT
Start: 2023-06-04 | End: 2023-06-09

## 2023-06-04 RX ORDER — SENNA PLUS 8.6 MG/1
2 TABLET ORAL AT BEDTIME
Refills: 0 | Status: DISCONTINUED | OUTPATIENT
Start: 2023-06-04 | End: 2023-06-09

## 2023-06-04 RX ORDER — DONEPEZIL HYDROCHLORIDE 10 MG/1
5 TABLET, FILM COATED ORAL AT BEDTIME
Refills: 0 | Status: DISCONTINUED | OUTPATIENT
Start: 2023-06-04 | End: 2023-06-09

## 2023-06-04 RX ORDER — ACETAMINOPHEN 500 MG
650 TABLET ORAL EVERY 6 HOURS
Refills: 0 | Status: COMPLETED | OUTPATIENT
Start: 2023-06-04 | End: 2023-06-04

## 2023-06-04 RX ORDER — DEXTROSE 50 % IN WATER 50 %
25 SYRINGE (ML) INTRAVENOUS ONCE
Refills: 0 | Status: DISCONTINUED | OUTPATIENT
Start: 2023-06-04 | End: 2023-06-09

## 2023-06-04 RX ORDER — LOSARTAN POTASSIUM 100 MG/1
100 TABLET, FILM COATED ORAL DAILY
Refills: 0 | Status: DISCONTINUED | OUTPATIENT
Start: 2023-06-04 | End: 2023-06-09

## 2023-06-04 RX ORDER — CHOLECALCIFEROL (VITAMIN D3) 125 MCG
1000 CAPSULE ORAL DAILY
Refills: 0 | Status: DISCONTINUED | OUTPATIENT
Start: 2023-06-04 | End: 2023-06-09

## 2023-06-04 RX ORDER — GABAPENTIN 400 MG/1
300 CAPSULE ORAL AT BEDTIME
Refills: 0 | Status: DISCONTINUED | OUTPATIENT
Start: 2023-06-04 | End: 2023-06-09

## 2023-06-04 RX ORDER — OXYCODONE HYDROCHLORIDE 5 MG/1
5 TABLET ORAL EVERY 6 HOURS
Refills: 0 | Status: DISCONTINUED | OUTPATIENT
Start: 2023-06-04 | End: 2023-06-09

## 2023-06-04 RX ORDER — DEXTROSE 50 % IN WATER 50 %
15 SYRINGE (ML) INTRAVENOUS ONCE
Refills: 0 | Status: DISCONTINUED | OUTPATIENT
Start: 2023-06-04 | End: 2023-06-09

## 2023-06-04 RX ORDER — GLUCAGON INJECTION, SOLUTION 0.5 MG/.1ML
1 INJECTION, SOLUTION SUBCUTANEOUS ONCE
Refills: 0 | Status: DISCONTINUED | OUTPATIENT
Start: 2023-06-04 | End: 2023-06-09

## 2023-06-04 RX ORDER — ALLOPURINOL 300 MG
300 TABLET ORAL DAILY
Refills: 0 | Status: DISCONTINUED | OUTPATIENT
Start: 2023-06-04 | End: 2023-06-09

## 2023-06-04 RX ORDER — INSULIN GLARGINE 100 [IU]/ML
75 INJECTION, SOLUTION SUBCUTANEOUS
Refills: 0 | DISCHARGE

## 2023-06-04 RX ORDER — DEXTROSE 50 % IN WATER 50 %
12.5 SYRINGE (ML) INTRAVENOUS ONCE
Refills: 0 | Status: DISCONTINUED | OUTPATIENT
Start: 2023-06-04 | End: 2023-06-09

## 2023-06-04 RX ADMIN — HEPARIN SODIUM 5000 UNIT(S): 5000 INJECTION INTRAVENOUS; SUBCUTANEOUS at 05:32

## 2023-06-04 RX ADMIN — GABAPENTIN 300 MILLIGRAM(S): 400 CAPSULE ORAL at 21:04

## 2023-06-04 RX ADMIN — INSULIN GLARGINE 60 UNIT(S): 100 INJECTION, SOLUTION SUBCUTANEOUS at 22:06

## 2023-06-04 RX ADMIN — CLOPIDOGREL BISULFATE 75 MILLIGRAM(S): 75 TABLET, FILM COATED ORAL at 11:33

## 2023-06-04 RX ADMIN — Medication 20 MILLIGRAM(S): at 05:33

## 2023-06-04 RX ADMIN — HEPARIN SODIUM 5000 UNIT(S): 5000 INJECTION INTRAVENOUS; SUBCUTANEOUS at 13:38

## 2023-06-04 RX ADMIN — Medication 650 MILLIGRAM(S): at 19:56

## 2023-06-04 RX ADMIN — Medication 2: at 11:33

## 2023-06-04 RX ADMIN — Medication 650 MILLIGRAM(S): at 05:33

## 2023-06-04 RX ADMIN — Medication 100 MILLIGRAM(S): at 05:33

## 2023-06-04 RX ADMIN — Medication 650 MILLIGRAM(S): at 06:16

## 2023-06-04 RX ADMIN — Medication 650 MILLIGRAM(S): at 11:34

## 2023-06-04 RX ADMIN — Medication 1000 UNIT(S): at 11:33

## 2023-06-04 RX ADMIN — HEPARIN SODIUM 5000 UNIT(S): 5000 INJECTION INTRAVENOUS; SUBCUTANEOUS at 21:05

## 2023-06-04 RX ADMIN — LOSARTAN POTASSIUM 100 MILLIGRAM(S): 100 TABLET, FILM COATED ORAL at 05:33

## 2023-06-04 RX ADMIN — DONEPEZIL HYDROCHLORIDE 5 MILLIGRAM(S): 10 TABLET, FILM COATED ORAL at 21:03

## 2023-06-04 RX ADMIN — Medication 300 MILLIGRAM(S): at 11:34

## 2023-06-04 RX ADMIN — CEFTRIAXONE 100 MILLIGRAM(S): 500 INJECTION, POWDER, FOR SOLUTION INTRAMUSCULAR; INTRAVENOUS at 21:03

## 2023-06-04 RX ADMIN — Medication 10 MILLIGRAM(S): at 11:34

## 2023-06-04 RX ADMIN — Medication 650 MILLIGRAM(S): at 23:07

## 2023-06-04 RX ADMIN — Medication 650 MILLIGRAM(S): at 18:50

## 2023-06-04 RX ADMIN — Medication 81 MILLIGRAM(S): at 11:37

## 2023-06-04 RX ADMIN — PANTOPRAZOLE SODIUM 40 MILLIGRAM(S): 20 TABLET, DELAYED RELEASE ORAL at 06:21

## 2023-06-04 RX ADMIN — Medication 650 MILLIGRAM(S): at 12:30

## 2023-06-04 NOTE — PROGRESS NOTE ADULT - SUBJECTIVE AND OBJECTIVE BOX
***************************************************************  Josie Penaloza, PGY 1  Internal Medicine   pager: [phone]  ***************************************************************    CHETNA MENDOZA  MRN: [id number]    Patient is a 83y old Male who presents with a chief complaint of     Subjective: No acute events overnight.      MEDICATIONS  (STANDING):  acetaminophen     Tablet .. 650 milliGRAM(s) Oral every 6 hours  allopurinol 300 milliGRAM(s) Oral daily  aspirin  chewable 81 milliGRAM(s) Oral daily  cefTRIAXone   IVPB 1000 milliGRAM(s) IV Intermittent every 24 hours  cholecalciferol 1000 Unit(s) Oral daily  clopidogrel Tablet 75 milliGRAM(s) Oral daily  dextrose 5%. 1000 milliLiter(s) (50 mL/Hr) IV Continuous <Continuous>  dextrose 5%. 1000 milliLiter(s) (100 mL/Hr) IV Continuous <Continuous>  dextrose 50% Injectable 12.5 Gram(s) IV Push once  dextrose 50% Injectable 25 Gram(s) IV Push once  dextrose 50% Injectable 25 Gram(s) IV Push once  donepezil 5 milliGRAM(s) Oral at bedtime  furosemide   Injectable 20 milliGRAM(s) IV Push daily  gabapentin 300 milliGRAM(s) Oral at bedtime  glucagon  Injectable 1 milliGRAM(s) IntraMuscular once  heparin   Injectable 5000 Unit(s) SubCutaneous every 8 hours  hydrOXYzine hydrochloride Syrup 10 milliGRAM(s) Oral daily  insulin glargine Injectable (LANTUS) 60 Unit(s) SubCutaneous at bedtime  insulin lispro (ADMELOG) corrective regimen sliding scale   SubCutaneous at bedtime  insulin lispro (ADMELOG) corrective regimen sliding scale   SubCutaneous three times a day before meals  losartan 100 milliGRAM(s) Oral daily  metolazone 5 milliGRAM(s) Oral daily  metoprolol succinate  milliGRAM(s) Oral daily  pantoprazole    Tablet 40 milliGRAM(s) Oral before breakfast  senna 2 Tablet(s) Oral at bedtime    MEDICATIONS  (PRN):  dextrose Oral Gel 15 Gram(s) Oral once PRN Blood Glucose LESS THAN 70 milliGRAM(s)/deciliter  oxyCODONE    IR 5 milliGRAM(s) Oral every 6 hours PRN Severe Pain (7 - 10)    Objective:  Vitals: Vital Signs Last 24 Hrs  T(C): 36.4 (06-04-23 @ 05:25), Max: 36.9 (06-03-23 @ 18:47)  T(F): 97.5 (06-04-23 @ 05:25), Max: 98.4 (06-03-23 @ 18:47)  HR: 61 (06-04-23 @ 05:25) (61 - 78)  BP: 168/81 (06-04-23 @ 05:25) (136/99 - 168/81)  RR: 18 (06-04-23 @ 05:25) (18 - 23)  SpO2: 93% (06-04-23 @ 05:25) (93% - 97%)               I&O's Summary    03 Jun 2023 07:01  -  04 Jun 2023 07:00  --------------------------------------------------------  IN: 100 mL / OUT: 800 mL / NET: -700 mL    PHYSICAL EXAM:  General: NAD, resting in bed, on RA  Lungs: clear to auscultation in anterior lung fields, no wheezes/rhonchi/rales  Heart: +s1/s2, RRR, no murmurs/gallops/rubs  Abdomen: soft, non-distended, non-tender to palpation, no rigidity  Extremities: +DP pulse bilaterally, no cyanosis/clubbing/edema      LABS:  06-03    139  |  99  |  17  ----------------------------<  96  4.3   |  29  |  0.79    Ca    9.8      03 Jun 2023 20:43    TPro  7.8  /  Alb  4.4  /  TBili  0.2  /  DBili  x   /  AST  28  /  ALT  33  /  AlkPhos  107  06-03               12.0   7.93  )-----------( 247      ( 04 Jun 2023 06:50 )             36.4                         12.6   8.50  )-----------( 269      ( 03 Jun 2023 20:42 )             37.7     CAPILLARY BLOOD GLUCOSE  POCT Blood Glucose.: 126 mg/dL (04 Jun 2023 07:28)  POCT Blood Glucose.: 102 mg/dL (04 Jun 2023 05:52)    RADIOLOGY & ADDITIONAL TESTS:    Imaging Personally Reviewed:  [x] YES  [ ] NO    Consultants involved in case:   Consultant(s) Notes Reviewed:  [x] YES  [ ] NO:   Care Discussed with Consultants/Other Providers [x] YES  [ ] NO ***************************************************************  Josie Penaloza, PGY 1  Internal Medicine   pager: [phone]  ***************************************************************    CHETNA MENDOZA  MRN: [id number]    Patient is a 83y old Male who presents with a chief complaint of     Subjective: No acute events overnight. Tolerating diet well. Denies CP or SOB.       MEDICATIONS  (STANDING):  acetaminophen     Tablet .. 650 milliGRAM(s) Oral every 6 hours  allopurinol 300 milliGRAM(s) Oral daily  aspirin  chewable 81 milliGRAM(s) Oral daily  cefTRIAXone   IVPB 1000 milliGRAM(s) IV Intermittent every 24 hours  cholecalciferol 1000 Unit(s) Oral daily  clopidogrel Tablet 75 milliGRAM(s) Oral daily  dextrose 5%. 1000 milliLiter(s) (50 mL/Hr) IV Continuous <Continuous>  dextrose 5%. 1000 milliLiter(s) (100 mL/Hr) IV Continuous <Continuous>  dextrose 50% Injectable 12.5 Gram(s) IV Push once  dextrose 50% Injectable 25 Gram(s) IV Push once  dextrose 50% Injectable 25 Gram(s) IV Push once  donepezil 5 milliGRAM(s) Oral at bedtime  furosemide   Injectable 20 milliGRAM(s) IV Push daily  gabapentin 300 milliGRAM(s) Oral at bedtime  glucagon  Injectable 1 milliGRAM(s) IntraMuscular once  heparin   Injectable 5000 Unit(s) SubCutaneous every 8 hours  hydrOXYzine hydrochloride Syrup 10 milliGRAM(s) Oral daily  insulin glargine Injectable (LANTUS) 60 Unit(s) SubCutaneous at bedtime  insulin lispro (ADMELOG) corrective regimen sliding scale   SubCutaneous at bedtime  insulin lispro (ADMELOG) corrective regimen sliding scale   SubCutaneous three times a day before meals  losartan 100 milliGRAM(s) Oral daily  metolazone 5 milliGRAM(s) Oral daily  metoprolol succinate  milliGRAM(s) Oral daily  pantoprazole    Tablet 40 milliGRAM(s) Oral before breakfast  senna 2 Tablet(s) Oral at bedtime    MEDICATIONS  (PRN):  dextrose Oral Gel 15 Gram(s) Oral once PRN Blood Glucose LESS THAN 70 milliGRAM(s)/deciliter  oxyCODONE    IR 5 milliGRAM(s) Oral every 6 hours PRN Severe Pain (7 - 10)    Objective:  Vitals: Vital Signs Last 24 Hrs  T(C): 36.4 (06-04-23 @ 05:25), Max: 36.9 (06-03-23 @ 18:47)  T(F): 97.5 (06-04-23 @ 05:25), Max: 98.4 (06-03-23 @ 18:47)  HR: 61 (06-04-23 @ 05:25) (61 - 78)  BP: 168/81 (06-04-23 @ 05:25) (136/99 - 168/81)  RR: 18 (06-04-23 @ 05:25) (18 - 23)  SpO2: 93% (06-04-23 @ 05:25) (93% - 97%)               I&O's Summary    03 Jun 2023 07:01  -  04 Jun 2023 07:00  --------------------------------------------------------  IN: 100 mL / OUT: 800 mL / NET: -700 mL    PHYSICAL EXAM:  General: NAD, resting in bed, on RA  Lungs: clear to auscultation in anterior lung fields, no wheezes  Heart: +s1/s2, RRR, no murmurs or gallops  Abdomen: soft, non-distended, non-tender to palpation, no rigidity  Extremities: RLE with ulcer on anterior shin surrounding erythema, pitting edema bilaterally    LABS:  06-03    139  |  99  |  17  ----------------------------<  96  4.3   |  29  |  0.79    Ca    9.8      03 Jun 2023 20:43    TPro  7.8  /  Alb  4.4  /  TBili  0.2  /  DBili  x   /  AST  28  /  ALT  33  /  AlkPhos  107  06-03               12.0   7.93  )-----------( 247      ( 04 Jun 2023 06:50 )             36.4                         12.6   8.50  )-----------( 269      ( 03 Jun 2023 20:42 )             37.7     CAPILLARY BLOOD GLUCOSE  POCT Blood Glucose.: 126 mg/dL (04 Jun 2023 07:28)  POCT Blood Glucose.: 102 mg/dL (04 Jun 2023 05:52)    RADIOLOGY & ADDITIONAL TESTS:    Imaging Personally Reviewed:  [x] YES  [ ] NO    Consultants involved in case:   Consultant(s) Notes Reviewed:  [x] YES  [ ] NO:   Care Discussed with Consultants/Other Providers [x] YES  [ ] NO

## 2023-06-04 NOTE — H&P ADULT - NSHPPHYSICALEXAM_GEN_ALL_CORE
LOS: 1d    VITALS:   T(C): 36.8 (06-04-23 @ 00:43), Max: 36.9 (06-03-23 @ 18:47)  HR: 68 (06-04-23 @ 00:43) (68 - 78)  BP: 160/76 (06-04-23 @ 00:43) (136/99 - 165/68)  RR: 18 (06-04-23 @ 00:43) (18 - 23)  SpO2: 94% (06-04-23 @ 00:43) (94% - 97%)    GENERAL: NAD, lying in bed comfortably  HEAD:  Atraumatic, Normocephalic  EYES: EOMI, PERRLA, conjunctiva and sclera clear  ENT: Moist mucous membranes  NECK: Supple  CHEST/LUNG: Clear to auscultation bilaterally anteriorly; No rales, rhonchi, wheezing, or rubs. Unlabored respirations  HEART: Regular rate and rhythm; No murmurs, rubs, or gallops  ABDOMEN: BSx4; Soft, nontender, nondistended  EXTREMITIES:  2+ Peripheral Pulses, brisk capillary refill. 2+ LE edema  NERVOUS SYSTEM:  A&Ox3, no focal deficits   SKIN: +R LE cellulitis lesion on lateral shin with warmth, swelling, erythema with a 2-3cm ulcerated lesion, not draining any purulent material

## 2023-06-04 NOTE — H&P ADULT - PROBLEM SELECTOR PLAN 3
Has pitting LE edema, suspect 2/2 venous stasis vs. less likely heart failure (no proBNP, no compelling evidence of pulmonary edema just small opacities on x ray)  - start IV Lasix 20mg qd for leg swelling  - c/w home metolazone  - order TTE   - monitor resp status, if concern for HF develops increase diuresis

## 2023-06-04 NOTE — H&P ADULT - PROBLEM SELECTOR PLAN 4
s/p remote stent  - c/w home ASA, Plavix  - not on a statin, consider inquiring why as suspect has HLD

## 2023-06-04 NOTE — H&P ADULT - PROBLEM SELECTOR PLAN 7
DVT ppx: HSQ  Diet: CC, DASH/TLC  Dispo: pending PT  Code status: full  Activity: OOB with assistance, fall precaution

## 2023-06-04 NOTE — PROGRESS NOTE ADULT - PROBLEM SELECTOR PLAN 3
Has pitting LE edema, suspect 2/2 venous stasis vs. less likely heart failure (no proBNP, no compelling evidence of pulmonary edema just small opacities on x ray)  - start IV Lasix 20mg qd for leg swelling  - c/w home metolazone  - order TTE   - monitor resp status, if concern for HF develops increase diuresis - bilateral pitting LE edema, possibly venous statis vs HF  - normal pro-BNP but CXR with possible pulmonary edema  - on home metolazone  > will check TTE   > continue lasix 20mg IVP qd  > resume home metolazone

## 2023-06-04 NOTE — H&P ADULT - NSHPREVIEWOFSYSTEMS_GEN_ALL_CORE
REVIEW OF SYSTEMS:    CONSTITUTIONAL: No weakness, fevers or chills  EYES: No visual changes; no sclera icterics, no pain or drainage  ENT:  No vertigo or throat pain   NECK: No pain or stiffness  RESPIRATORY: +chronic cough, no wheezing, hemoptysis; No shortness of breath  CARDIOVASCULAR: No chest pain or palpitations  GASTROINTESTINAL: No abdominal or epigastric pain. No nausea, vomiting, or hematemesis; No diarrhea or constipation. No melena or hematochezia.  GENITOURINARY: No dysuria, frequency or hematuria  NEUROLOGICAL: No numbness or weakness  SKIN: +right LE wound  EXTREMITIES: +LE swelling  PSYCH: No anxiety or depression

## 2023-06-04 NOTE — DISCHARGE NOTE PROVIDER - HOSPITAL COURSE
82 y/o Farsi speaking male with hx of IDDM, HTN, HLD, CAD s/p PCI, gout, OA who presented with several day history of right lower leg swelling, pain, and discharge. Patient was started on IV antibiotics for suspected cellulitis. XR was negative for fractures or OM. Duplex was negative for DVTs. Patient was also diuresed with IV lasix in setting of bilateral leg swelling and pulmonary edema seen on CXR. TTE showed ____. Patient was switched to ___ on discharge. 82 y/o Farsi speaking male with hx of IDDM, HTN, HLD, CAD s/p PCI, gout, OA who presented with several day history of right lower leg swelling, pain, and discharge. Patient was started on IV ceftriaxone for suspected cellulitis. XR was negative for fractures or OM. Duplex was negative for DVTs. Patient was also diuresed with IV lasix in setting of bilateral leg swelling and pulmonary edema seen on CXR. TTE showed EF 60-65%, fibrocalcific AV, negative otherwise. Patient RLE wound and cellulitis improved with antibiotics and wound care with frequent dressing changes. Patient will be discharged on keflex for 3 more days.    Course complicated by hyperglycemia. Patient will need endocrinology follow up.    On day of discharge, patient is clinically stable with no new exam findings or acute symptoms compared to prior. The patient was seen by the attending physician on the date of discharge and deemed stable and acceptable for discharge.     Discharge follow up action items:   Endocrinology follow up  Keflex x3 days   Wound care:  BUTTOCKS/ SACRUM:  ZANDRA cream BID & prn soiling to buttocks w/ pericare hygeine   RLE WOUND: clean with NS, pat dry, wrap with gauze  OFFLOADING: Z felicia & FLOWLOCK /Complete CAIR boot to assist   WAFFLE cushion to chair when oob to chair 84 y/o Farsi speaking male with IDDM, HTN, CAD s/p PCI, gout, OA who presented with several day history of right lower leg swelling, pain and discharge.     Patient was started on IV ceftriaxone for cellulitis. XR was negative for fractures or OM. Duplex was negative for DVTs.     Patient was also diuresed with IV lasix in setting of bilateral leg swelling and pulmonary edema seen on CXR. TTE showed EF 60-65%, fibrocalcific AV, negative otherwise.     Patient RLE wound and cellulitis improved with antibiotics and wound care with frequent dressing changes. Patient will be discharged on keflex for 3 more days.    Course complicated by hyperglycemia- reported poor dietary compliance at home. Fingersticks improved with treatment. Patient will need endocrinology follow up.    PMD and family updated on course.     Pt discharged to rehab w follow up.     Discharge follow up action items:     Endocrinology follow up  Keflex x3 days   Wound care:  BUTTOCKS/ SACRUM:  ZANDRA cream BID & prn soiling to buttocks w/ pericare hygeine   RLE WOUND: clean with NS, pat dry, wrap with gauze  OFFLOADING: Z felicia & FLOWLOCK /Complete CAIR boot to assist   WAFFLE cushion to chair when oob to chair

## 2023-06-04 NOTE — PROGRESS NOTE ADULT - PROBLEM SELECTOR PLAN 2
Takes 75U Lantus, glyburide-metformin qid  - start Lantus at 80% home dose, increase prn  - mod ISS given likely insulin resistant - on home 75U Lantus and glyburide-metformin qid  - A1c 8.8 on admission  > start lantus at 80% home dose, increase PRN  > moderate SSI given likely insulin resistant  > fingersticks tidac & qhs, CC diet

## 2023-06-04 NOTE — PATIENT PROFILE ADULT - STATED REASON FOR ADMISSION
Pt. c/o Right Leg wound & swollen Complex Repair And Skin Substitute Graft Text: The defect edges were debeveled with a #15 scalpel blade.  The primary defect was closed partially with a complex linear closure.  Given the location of the remaining defect, shape of the defect and the proximity to free margins a skin substitute graft was deemed most appropriate to repair the remaining defect.  The graft was trimmed to fit the size of the remaining defect.  The graft was then placed in the primary defect, oriented appropriately, and sutured into place.

## 2023-06-04 NOTE — DISCHARGE NOTE PROVIDER - NSDCCPCAREPLAN_GEN_ALL_CORE_FT
PRINCIPAL DISCHARGE DIAGNOSIS  Diagnosis: Cellulitis  Assessment and Plan of Treatment: You came to the hospital due to right leg pain, swelling, redness, and discharge, which were likely due to soft tissue infection called cellulitis. X ray did not show any infection in the bone. Sonogram of the leg ruled out any bloo clots in the vessels. You were treated with IV antibiotics in the hospital. If you have worsening symptoms with fevers and chills, please return to the hospital.     PRINCIPAL DISCHARGE DIAGNOSIS  Diagnosis: Cellulitis  Assessment and Plan of Treatment: You came to the hospital due to right leg pain, swelling, redness, and discharge, which were likely due to a soft tissue infection called cellulitis. X ray did not show any infection in the bone. Sonogram of the leg ruled out any blood clots in the vessels. You were treated with IV antibiotics in the hospital. You will be discharged with 3 more days of keflex to complete at rehab. If you have worsening symptoms with fevers and chills, please return to the hospital.      SECONDARY DISCHARGE DIAGNOSES  Diagnosis: Coronary artery disease  Assessment and Plan of Treatment: For your coronary artery disease, we spoke with your PCP and discontinued your plavix. You should continue to take aspirin 81mg every day as originally prescribed. Please follow up with your PCP after discharge.    Diagnosis: Insulin dependent type 2 diabetes mellitus  Assessment and Plan of Treatment: Your blood glucose was elevated during your hospital stay. Please follow up with an endocrinologist after hospitalization for further management.

## 2023-06-04 NOTE — PROGRESS NOTE ADULT - PROBLEM SELECTOR PLAN 4
s/p remote stent  - c/w home ASA, Plavix  - not on a statin, consider inquiring why as suspect has HLD > continue home ASA and plavix  > consider starting statin after clarifying with son reasons for not being on it

## 2023-06-04 NOTE — H&P ADULT - PROBLEM SELECTOR PLAN 6
On naproxen 500mg qd, Tylenol qd  - hold naproxen while giving vanc to avoid nephrotoxicity  - start Tylenol ATC x1 day  - oxycodone prn for severe pain On naproxen 500mg qd, Tylenol qd  - start Tylenol ATC x1 day  - trial off NSAID given advanced age  - oxycodone prn for severe pain On naproxen 500mg qd, Tylenol qd  - start Tylenol ATC x1 day  - continue home naproxen  - oxycodone prn for severe pain

## 2023-06-04 NOTE — H&P ADULT - ATTENDING COMMENTS
83M Pmh DM presents for LE edema, progressive SOB admitted for cellulitis and suspected new onset CHF       #New CHF Suspected   - Exam notable for b/l LE edema    - EKG: sinus carlos manuel 57 bpm, 1st AVB   - CXR: Patchy perihilar opacities may represent pulm edema   - Trop 25    BNP <36   - Presentation not strongly suggestive of CHF  - Telemetry, check Echo   - Given presence of LE edema + SOB + reported hypoxia by ED, will get LE doppler to r/o DVT   - Elevate LE, compression dsg   - Low dose IV diuretics to help with LE edema        #Cellulitis   - c/w IV ceftriaxone    - Wound cx, MRSA screen   - Wound care consult    - Fall precaution   - PT consult       Rest per resident 83M Pmh DM presents for LE edema, progressive SOB admitted for cellulitis and leg swelling.     Per son by bedside, pt has poor dietary compliance at home.    Continue IV Lasix, Ceftriaxone for cellulitis. PT consult. Echo.

## 2023-06-04 NOTE — H&P ADULT - PROBLEM SELECTOR PLAN 2
Takes 75U Lantus, glyburide-metformin qid  - start Lantus at 80% home dose, increase prn  - mod ISS given likely insulin resistant

## 2023-06-04 NOTE — PROGRESS NOTE ADULT - PROBLEM SELECTOR PLAN 1
Developed in the setting of immunosuppression (T2DM, advanced age)  - started CTX in ED, can continue  - MRSA swab  - obtain plain film of R LE  - f/u cultures - presenting with 3-5 day hx of RLE erythema, swelling, drainage  - XR without underlying OM, duplex negative for DVTs  > continue ceftriaxone (6/3- )  > MRSA swab, f/u cultures

## 2023-06-04 NOTE — PATIENT PROFILE ADULT - FALL HARM RISK - HARM RISK INTERVENTIONS
Assistance with ambulation/Assistance OOB with selected safe patient handling equipment/Communicate Risk of Fall with Harm to all staff/Discuss with provider need for PT consult/Monitor gait and stability/Move patient closer to nurses' station/Provide patient with walking aids - walker, cane, crutches/Reinforce activity limits and safety measures with patient and family/Reorient to person, place and time as needed/Sit up slowly, dangle for a short time, stand at bedside before walking/Tailored Fall Risk Interventions/Use of alarms - bed, chair and/or voice tab/Visual Cue: Yellow wristband and red socks/Bed in lowest position, wheels locked, appropriate side rails in place/Call bell, personal items and telephone in reach/Instruct patient to call for assistance before getting out of bed or chair/Non-slip footwear when patient is out of bed/[place] to call system/Physically safe environment - no spills, clutter or unnecessary equipment/Purposeful Proactive Rounding/Room/bathroom lighting operational, light cord in reach

## 2023-06-04 NOTE — PHYSICAL THERAPY INITIAL EVALUATION ADULT - PERTINENT HX OF CURRENT PROBLEM, REHAB EVAL
82 y/o M with h/o T2DM, HTN, HLD, CAD (s/p stent), gout, OA p/w RLE swelling and pain for the last 4 days. pt a/w cellulitis. DUPLEX BLE (-) CHEST XRAY: Patchy perihilar opacities which may represent pulmonary edema. XRAY R TIBIA/FIBULAR: no evidence of OM.

## 2023-06-04 NOTE — PHYSICAL THERAPY INITIAL EVALUATION ADULT - PLANNED THERAPY INTERVENTIONS, PT EVAL
WC MANAGEMENT; GOAL: Pt will negotiate 6 steps with unilateral handrail in a step-to pattern independently within 4 weeks./balance training/bed mobility training/gait training/strengthening/transfer training

## 2023-06-04 NOTE — DISCHARGE NOTE PROVIDER - NSDCCPTREATMENT_GEN_ALL_CORE_FT
PRINCIPAL PROCEDURE  Procedure: XR tibia fibula RT 2V  Findings and Treatment: ACC: [id number] EXAM:  XR TIB FIB AP LAT 2 VIEWS RT    PROCEDURE DATE:  06/04/2023    INTERPRETATION:  CLINICAL INDICATION: Cellulitis  TECHNIQUE: 3 images and 2 views of the right tib-fib  COMPARISON: X-ray tib-fib 1/15/2019.  FINDINGS:  Chronically malaligned but healed fracture deformity of the midshaft of   the right fibula. Additional cortical deformity of the distal fibula. Medial knee joint space loss. Bilateral marginal osteophytes of the knee. There are no lytic or blastic lesions. No soft tissue gas tracking. Vascular calcifications.  IMPRESSION: No changes consistent with osteomyelitis.      SECONDARY PROCEDURE  Procedure: Venous duplex scan RLE  Findings and Treatment: ACC: [id number] EXAM:  DUPLEX SCAN EXT VEINS LOWER BI    PROCEDURE DATE:  06/04/2023    COMPARISON: None available.  TECHNIQUE: Duplex sonography of the BILATERAL LOWER extremities with color and spectral Doppler, with and without compression.  FINDINGS: There is normal compressibility of the bilateral common femoral, femoral and popliteal veins. No calf vein thrombosis is detected. Doppler examination shows normal spontaneous and phasic flow. Bilateral lower extremity soft tissue edema.  IMPRESSION: No evidence of bilateral lower extremity deep venous thrombosis. Bilateral lower extremity soft tissue edema. Correlate clinically.

## 2023-06-04 NOTE — H&P ADULT - HISTORY OF PRESENT ILLNESS
Patient is Farsi speaking; offered to speak with him with an  but he declined, as he preferred his son interpret for him at bedside.    83M with insulin [id number], HTN, HLD, CAD (s/p stent) gout, OA who presents with R LE swelling and pain x 4days. States developed pain and warmth to the R lower extremity which progressively worsened and led to ulceration and drainage of "water" described as thick and white. The pain, drainage, and swelling progressively worsened, which prompted him to go to the hospital. Denies recent travel, sick contacts, antibiotic use, hospitalizations. Denies associated fevers, chills, abdominal pain. Does report that he's had some increased secretions and coughing up phlegm recently which improved with a percussion vest.     ED course notable for normal vitals, normal labs including proBNP <36, CXR with patchy perihilar opacities; POCUS performed in the ED was with a reportedly normal EF and no B lines. Patient is Farsi speaking; offered to speak with him with an  but he declined, as he preferred his son interpret for him at bedside.    83M with insulin [id number], HTN, HLD, CAD (s/p stent) gout, OA who presents with R LE swelling and pain x 4days. States developed pain and warmth to the R lower extremity which progressively worsened and led to ulceration and drainage of "water" described as thick and white. Denies recent travel, sick contacts, antibiotic use, hospitalizations. Denies associated fevers, chills, abdominal pain. Does report that he's had some increased secretions and coughing up phlegm recently which improved with a percussion vest.     ED course notable for normal vitals, normal labs including proBNP <36, CXR with patchy perihilar opacities; POCUS performed in the ED was with a reportedly normal EF and no B lines.

## 2023-06-04 NOTE — PROGRESS NOTE ADULT - PROBLEM SELECTOR PLAN 6
On naproxen 500mg qd, Tylenol qd  - start Tylenol ATC x1 day  - continue home naproxen  - oxycodone prn for severe pain - on home naproxen 500mg qd and tylenol qd  > PRN tylenol and naproxen

## 2023-06-04 NOTE — H&P ADULT - NSHPLABSRESULTS_GEN_ALL_CORE
LABS:                          12.6   8.50  )-----------( 269      ( 03 Jun 2023 20:42 )             37.7     06-03    139  |  99  |  17  ----------------------------<  96  4.3   |  29  |  0.79    Ca    9.8      03 Jun 2023 20:43    TPro  7.8  /  Alb  4.4  /  TBili  0.2  /  DBili  x   /  AST  28  /  ALT  33  /  AlkPhos  107  06-03    LIVER FUNCTIONS - ( 03 Jun 2023 20:43 )  Alb: 4.4 g/dL / Pro: 7.8 g/dL / ALK PHOS: 107 U/L / ALT: 33 U/L / AST: 28 U/L / GGT: x

## 2023-06-04 NOTE — H&P ADULT - ASSESSMENT
83M with insulin [id number], HTN, HLD, gout, OA who presents with R LE cellulitis requiring IV abx.

## 2023-06-04 NOTE — DISCHARGE NOTE PROVIDER - NSDCMRMEDTOKEN_GEN_ALL_CORE_FT
acetaminophen 650 mg oral tablet, extended release: orally doesn&#x27;t specify frequency on med list  allopurinol 300 mg oral tablet: 1 tab(s) orally once a day  aspirin 81 mg oral tablet, chewable: 1 tab(s) orally once a day  clopidogrel 75 mg oral tablet: 1 tab(s) orally once a day  donepezil 5 mg oral tablet: 1 tab(s) orally once a day (at bedtime)  esomeprazole 40 mg oral delayed release capsule: 1 cap(s) orally once a day  gabapentin 300 mg oral capsule: 1 cap(s) orally once a day (at bedtime)  glyburide-metformin 5 mg-500 mg oral tablet: 1 tab(s) orally 4 times a day  hydrOXYzine hydrochloride 10 mg oral tablet: 1 tab(s) orally once a day  Lantus 100 units/mL subcutaneous solution: 75 unit(s) subcutaneous once a day (at bedtime)  losartan 100 mg oral tablet: 1 tab(s) orally once a day  metOLazone 5 mg oral tablet: 1 tab(s) orally once a day  metoprolol succinate 100 mg oral tablet, extended release: 1 tab(s) orally once a day  naproxen 500 mg oral tablet: 1 tab(s) orally once a day  Senna-gen 8.6 mg oral tablet: 2 tab(s) orally once a day (at bedtime)  Vitamin D3 25 mcg (1000 intl units) oral tablet: 1 tab(s) orally once a day   allopurinol 300 mg oral tablet: 1 tab(s) orally once a day  aspirin 81 mg oral tablet, chewable: 1 tab(s) orally once a day  cephalexin 500 mg oral capsule: 1 cap(s) orally 4 times a day Stop after 3 days (6/10-6/12)  donepezil 5 mg oral tablet: 1 tab(s) orally once a day (at bedtime)  esomeprazole 40 mg oral delayed release capsule: 1 cap(s) orally once a day  gabapentin 300 mg oral capsule: 1 cap(s) orally once a day (at bedtime)  glyburide-metformin 5 mg-500 mg oral tablet: 1 tab(s) orally 4 times a day  insulin lispro 100 units/mL injectable solution: 1 application injectable 3 times a day (before meals) 2 Unit(s) if Glucose 151 - 200  4 Unit(s) if Glucose 201 - 250  6 Unit(s) if Glucose 251 - 300  insulin lispro 100 units/mL injectable solution: 1 application injectable once a day (at bedtime) 0 Unit(s) if Glucose 61 - 250  2 Unit(s) if Glucose 251 - 300  4 Unit(s) if Glucose 301 - 350  6 Unit(s) if Glucose 351 - 400  8 Unit(s) if Glucose Greater Than 400  Lantus 100 units/mL subcutaneous solution: 75 unit(s) subcutaneous once a day (at bedtime)  losartan 100 mg oral tablet: 1 tab(s) orally once a day  metOLazone 5 mg oral tablet: 1 tab(s) orally once a day  metoprolol succinate 100 mg oral tablet, extended release: 1 tab(s) orally once a day  oxyCODONE 5 mg oral tablet: 1 tab(s) orally every 6 hours As needed Severe Pain (7 - 10)  polyethylene glycol 3350 oral powder for reconstitution: 17 gram(s) orally once a day  senna leaf extract oral tablet: 2 tab(s) orally once a day (at bedtime)  tamsulosin 0.4 mg oral capsule: 1 cap(s) orally once a day (at bedtime)  Vitamin D3 25 mcg (1000 intl units) oral tablet: 1 tab(s) orally once a day

## 2023-06-04 NOTE — DISCHARGE NOTE PROVIDER - NSDCFUADDAPPT_GEN_ALL_CORE_FT
APPTS ARE READY TO BE MADE: [ ] YES    Best Family or Patient Contact (if needed):    Additional Information about above appointments (if needed):    1:   2:   3:  APPTS ARE READY TO BE MADE: [X] YES    Best Family or Patient Contact (if needed):    Additional Information about above appointments (if needed):    1:   2:   3:  APPTS ARE READY TO BE MADE: [X] YES    Best Family or Patient Contact (if needed):    Additional Information about above appointments (if needed):    1:   2:   3:     Patient is being discharged to rehab. Patient/caregiver will arrange follow up care appointments.

## 2023-06-04 NOTE — DISCHARGE NOTE PROVIDER - DISCHARGE SERVICE FOR PATIENT
on the discharge service for the patient. I have reviewed and made amendments to the documentation where necessary. 23:49

## 2023-06-04 NOTE — PHYSICAL THERAPY INITIAL EVALUATION ADULT - ADDITIONAL COMMENTS
pt states he lives in a private home with spouse with about 6 steps to enter (+handrail), first floor set up inside. Pt states prior to admission being independent with all functional mobility and ADLs. pt states he ambulates short distances with RW at baseline. pt reports he owns a straight cane, and shower chair.

## 2023-06-04 NOTE — H&P ADULT - PROBLEM SELECTOR PLAN 1
Developed in the setting of immunosuppression (T2DM, advanced age)  - start vancomycin, 15mg/kg  - obtain plain film of R LE  - f/u cultures Developed in the setting of immunosuppression (T2DM, advanced age)  - start CTX  - MRSA swab  - obtain plain film of R LE  - f/u cultures Developed in the setting of immunosuppression (T2DM, advanced age)  - started CTX in ED, can continue  - MRSA swab  - obtain plain film of R LE  - f/u cultures

## 2023-06-04 NOTE — PHYSICAL THERAPY INITIAL EVALUATION ADULT - MODALITIES TREATMENT COMMENTS
dressing rec'd C/D/I, no purulence or malodor, no drainage at this time (son states copious serous drainage yesterday), +blanchable erythema and associated redness to periwound. wound 100% slough covering. wound cleansed w/ NS, medihoney to promote autolytic debridement, dry gauze cover, ACE wrap applied to pt comfort from forefoot to proximal calf in figure-8 fashion with 50% tension/50% overlap. Pt able to wiggle toes, sensation intact, good <3 second capillary refill.

## 2023-06-04 NOTE — DISCHARGE NOTE PROVIDER - NSFOLLOWUPCLINICS_GEN_ALL_ED_FT
[facility]  Endocrinology  [address] [place] [address]  Phone: [phone]  Fax:

## 2023-06-04 NOTE — PHYSICAL THERAPY INITIAL EVALUATION ADULT - GENERAL OBSERVATIONS, REHAB EVAL
pt tracy 40 min eval well. pt rec'd in stretcher, tele, NAD, agreed to session, following all commands, son at bedside/present throughout.

## 2023-06-05 DIAGNOSIS — R32 UNSPECIFIED URINARY INCONTINENCE: ICD-10-CM

## 2023-06-05 LAB
ALBUMIN SERPL ELPH-MCNC: 3.8 G/DL — SIGNIFICANT CHANGE UP (ref 3.3–5)
ALP SERPL-CCNC: 99 U/L — SIGNIFICANT CHANGE UP (ref 40–120)
ALT FLD-CCNC: 25 U/L — SIGNIFICANT CHANGE UP (ref 10–45)
ANION GAP SERPL CALC-SCNC: 14 MMOL/L — SIGNIFICANT CHANGE UP (ref 5–17)
APPEARANCE UR: CLEAR — SIGNIFICANT CHANGE UP
AST SERPL-CCNC: 20 U/L — SIGNIFICANT CHANGE UP (ref 10–40)
BACTERIA # UR AUTO: NEGATIVE — SIGNIFICANT CHANGE UP
BILIRUB SERPL-MCNC: 0.3 MG/DL — SIGNIFICANT CHANGE UP (ref 0.2–1.2)
BILIRUB UR-MCNC: NEGATIVE — SIGNIFICANT CHANGE UP
BUN SERPL-MCNC: 13 MG/DL — SIGNIFICANT CHANGE UP (ref 7–23)
CALCIUM SERPL-MCNC: 9.1 MG/DL — SIGNIFICANT CHANGE UP (ref 8.4–10.5)
CHLORIDE SERPL-SCNC: 99 MMOL/L — SIGNIFICANT CHANGE UP (ref 96–108)
CO2 SERPL-SCNC: 27 MMOL/L — SIGNIFICANT CHANGE UP (ref 22–31)
COLOR SPEC: SIGNIFICANT CHANGE UP
CREAT SERPL-MCNC: 0.82 MG/DL — SIGNIFICANT CHANGE UP (ref 0.5–1.3)
DIFF PNL FLD: NEGATIVE — SIGNIFICANT CHANGE UP
EGFR: 87 ML/MIN/1.73M2 — SIGNIFICANT CHANGE UP
EPI CELLS # UR: 1 /HPF — SIGNIFICANT CHANGE UP
GLUCOSE BLDC GLUCOMTR-MCNC: 114 MG/DL — HIGH (ref 70–99)
GLUCOSE BLDC GLUCOMTR-MCNC: 134 MG/DL — HIGH (ref 70–99)
GLUCOSE BLDC GLUCOMTR-MCNC: 142 MG/DL — HIGH (ref 70–99)
GLUCOSE BLDC GLUCOMTR-MCNC: 304 MG/DL — HIGH (ref 70–99)
GLUCOSE SERPL-MCNC: 106 MG/DL — HIGH (ref 70–99)
GLUCOSE UR QL: NEGATIVE — SIGNIFICANT CHANGE UP
HCT VFR BLD CALC: 36.3 % — LOW (ref 39–50)
HGB BLD-MCNC: 12.4 G/DL — LOW (ref 13–17)
HYALINE CASTS # UR AUTO: 2 /LPF — SIGNIFICANT CHANGE UP (ref 0–7)
KETONES UR-MCNC: NEGATIVE — SIGNIFICANT CHANGE UP
LEUKOCYTE ESTERASE UR-ACNC: NEGATIVE — SIGNIFICANT CHANGE UP
MAGNESIUM SERPL-MCNC: 1.5 MG/DL — LOW (ref 1.6–2.6)
MCHC RBC-ENTMCNC: 33 PG — SIGNIFICANT CHANGE UP (ref 27–34)
MCHC RBC-ENTMCNC: 34.2 GM/DL — SIGNIFICANT CHANGE UP (ref 32–36)
MCV RBC AUTO: 96.5 FL — SIGNIFICANT CHANGE UP (ref 80–100)
NITRITE UR-MCNC: NEGATIVE — SIGNIFICANT CHANGE UP
NRBC # BLD: 0 /100 WBCS — SIGNIFICANT CHANGE UP (ref 0–0)
PH UR: 6 — SIGNIFICANT CHANGE UP (ref 5–8)
PHOSPHATE SERPL-MCNC: 4.5 MG/DL — SIGNIFICANT CHANGE UP (ref 2.5–4.5)
PLATELET # BLD AUTO: 248 K/UL — SIGNIFICANT CHANGE UP (ref 150–400)
POTASSIUM SERPL-MCNC: 3.2 MMOL/L — LOW (ref 3.5–5.3)
POTASSIUM SERPL-SCNC: 3.2 MMOL/L — LOW (ref 3.5–5.3)
PROT SERPL-MCNC: 7 G/DL — SIGNIFICANT CHANGE UP (ref 6–8.3)
PROT UR-MCNC: ABNORMAL
RBC # BLD: 3.76 M/UL — LOW (ref 4.2–5.8)
RBC # FLD: 13.1 % — SIGNIFICANT CHANGE UP (ref 10.3–14.5)
RBC CASTS # UR COMP ASSIST: 1 /HPF — SIGNIFICANT CHANGE UP (ref 0–4)
SODIUM SERPL-SCNC: 140 MMOL/L — SIGNIFICANT CHANGE UP (ref 135–145)
SP GR SPEC: 1.02 — SIGNIFICANT CHANGE UP (ref 1.01–1.02)
UROBILINOGEN FLD QL: NEGATIVE — SIGNIFICANT CHANGE UP
WBC # BLD: 7.12 K/UL — SIGNIFICANT CHANGE UP (ref 3.8–10.5)
WBC # FLD AUTO: 7.12 K/UL — SIGNIFICANT CHANGE UP (ref 3.8–10.5)
WBC UR QL: 1 /HPF — SIGNIFICANT CHANGE UP (ref 0–5)

## 2023-06-05 PROCEDURE — 99233 SBSQ HOSP IP/OBS HIGH 50: CPT | Mod: GC

## 2023-06-05 PROCEDURE — 93306 TTE W/DOPPLER COMPLETE: CPT | Mod: 26

## 2023-06-05 PROCEDURE — 99222 1ST HOSP IP/OBS MODERATE 55: CPT

## 2023-06-05 RX ORDER — ACETAMINOPHEN 500 MG
650 TABLET ORAL EVERY 6 HOURS
Refills: 0 | Status: DISCONTINUED | OUTPATIENT
Start: 2023-06-05 | End: 2023-06-09

## 2023-06-05 RX ORDER — MAGNESIUM SULFATE 500 MG/ML
2 VIAL (ML) INJECTION ONCE
Refills: 0 | Status: COMPLETED | OUTPATIENT
Start: 2023-06-05 | End: 2023-06-05

## 2023-06-05 RX ORDER — CHLORHEXIDINE GLUCONATE 213 G/1000ML
1 SOLUTION TOPICAL
Refills: 0 | Status: DISCONTINUED | OUTPATIENT
Start: 2023-06-05 | End: 2023-06-09

## 2023-06-05 RX ORDER — POTASSIUM CHLORIDE 20 MEQ
40 PACKET (EA) ORAL EVERY 4 HOURS
Refills: 0 | Status: COMPLETED | OUTPATIENT
Start: 2023-06-05 | End: 2023-06-05

## 2023-06-05 RX ADMIN — Medication 650 MILLIGRAM(S): at 10:16

## 2023-06-05 RX ADMIN — Medication 81 MILLIGRAM(S): at 12:40

## 2023-06-05 RX ADMIN — Medication 100 MILLIGRAM(S): at 05:54

## 2023-06-05 RX ADMIN — CEFTRIAXONE 100 MILLIGRAM(S): 500 INJECTION, POWDER, FOR SOLUTION INTRAMUSCULAR; INTRAVENOUS at 23:05

## 2023-06-05 RX ADMIN — PANTOPRAZOLE SODIUM 40 MILLIGRAM(S): 20 TABLET, DELAYED RELEASE ORAL at 05:54

## 2023-06-05 RX ADMIN — Medication 1000 UNIT(S): at 12:40

## 2023-06-05 RX ADMIN — LOSARTAN POTASSIUM 100 MILLIGRAM(S): 100 TABLET, FILM COATED ORAL at 05:54

## 2023-06-05 RX ADMIN — DONEPEZIL HYDROCHLORIDE 5 MILLIGRAM(S): 10 TABLET, FILM COATED ORAL at 23:06

## 2023-06-05 RX ADMIN — HEPARIN SODIUM 5000 UNIT(S): 5000 INJECTION INTRAVENOUS; SUBCUTANEOUS at 23:06

## 2023-06-05 RX ADMIN — Medication 10 MILLIGRAM(S): at 16:45

## 2023-06-05 RX ADMIN — Medication 650 MILLIGRAM(S): at 00:21

## 2023-06-05 RX ADMIN — HEPARIN SODIUM 5000 UNIT(S): 5000 INJECTION INTRAVENOUS; SUBCUTANEOUS at 16:45

## 2023-06-05 RX ADMIN — Medication 40 MILLIEQUIVALENT(S): at 08:47

## 2023-06-05 RX ADMIN — HEPARIN SODIUM 5000 UNIT(S): 5000 INJECTION INTRAVENOUS; SUBCUTANEOUS at 05:54

## 2023-06-05 RX ADMIN — Medication 650 MILLIGRAM(S): at 09:46

## 2023-06-05 RX ADMIN — Medication 40 MILLIEQUIVALENT(S): at 12:40

## 2023-06-05 RX ADMIN — INSULIN GLARGINE 60 UNIT(S): 100 INJECTION, SOLUTION SUBCUTANEOUS at 23:05

## 2023-06-05 RX ADMIN — Medication 8: at 12:41

## 2023-06-05 RX ADMIN — Medication 300 MILLIGRAM(S): at 12:40

## 2023-06-05 RX ADMIN — Medication 25 GRAM(S): at 08:46

## 2023-06-05 RX ADMIN — CLOPIDOGREL BISULFATE 75 MILLIGRAM(S): 75 TABLET, FILM COATED ORAL at 12:40

## 2023-06-05 RX ADMIN — Medication 20 MILLIGRAM(S): at 05:56

## 2023-06-05 RX ADMIN — GABAPENTIN 300 MILLIGRAM(S): 400 CAPSULE ORAL at 23:06

## 2023-06-05 NOTE — CONSULT NOTE ADULT - SUBJECTIVE AND OBJECTIVE BOX
Wound SURGERY CONSULT NOTE    HPI:  Patient is Farsi speaking; offered to speak with him with an  but he declined, as he preferred his son interpret for him at bedside.    83M with insulin [id number], HTN, HLD, CAD (s/p stent) gout, OA who presents with R LE swelling and pain x 4days. States developed pain and warmth to the R lower extremity which progressively worsened and led to ulceration and drainage of "water" described as thick and white. Denies recent travel, sick contacts, antibiotic use, hospitalizations. Denies associated fevers, chills, abdominal pain. Does report that he's had some increased secretions and coughing up phlegm recently which improved with a percussion vest.     ED course notable for normal vitals, normal labs including proBNP <36, CXR with patchy perihilar opacities; POCUS performed in the ED was with a reportedly normal EF and no B lines. (04 Jun 2023 01:25)        N/V/D,  BM/ Flatus,   NGT,     palp/ sob/dyspnea/ cp,       F/C/S  Wound consult requested by team to assist w/ management of      wound/ pressure injury.   Pt (unable to)  c/o pain, drainage, odor, color change,  or worsening swelling. Offloading and pericare initiated upon admission as pt Increasingly sedentary 2/2 to illness. Pt is Incontinent of urine & stool. (+)horner/ ostomy.   No h/o bites, scratches, falls, trauma.  Pt seen by Wound RN  [facility]/  Mary,TRIAD/ Aquacell/ medihoney/ Allevyn foam/ dakins/ Adaptic/ DSD recommended used at home/ while awaiting consult.  Appetite good/ decreased.  weight loss.  S&S / RD consult appreciated All questions asked and answered to pt's and family's expressed understanding and satisfaction.    Current Diet: Diet, Consistent Carbohydrate/No Snacks:   DASH/TLC Sodium & Cholesterol Restricted (DASH)  Kosher (06-04-23 @ 11:40)      PAST MEDICAL & SURGICAL HISTORY:  Diabetes      Hypertension      Hyperlipidemia      CAD (coronary artery disease)      Gout      BPH (benign prostatic hyperplasia)      S/P primary angioplasty with coronary stent  4/2013- one stent      S/P hip replacement  right      Fracture, ankle  left      Status post cataract extraction  left eye done on 7/17/2019          REVIEW OF SYSTEMS: Pt unable to offer  General/ Breast/ Skin/Vasc/ Neuro/ MSK: see HPI  All other systems negative    MEDICATIONS  (STANDING):  allopurinol 300 milliGRAM(s) Oral daily  aspirin  chewable 81 milliGRAM(s) Oral daily  cefTRIAXone   IVPB 1000 milliGRAM(s) IV Intermittent every 24 hours  chlorhexidine 2% Cloths 1 Application(s) Topical <User Schedule>  cholecalciferol 1000 Unit(s) Oral daily  clopidogrel Tablet 75 milliGRAM(s) Oral daily  dextrose 5%. 1000 milliLiter(s) (50 mL/Hr) IV Continuous <Continuous>  dextrose 5%. 1000 milliLiter(s) (100 mL/Hr) IV Continuous <Continuous>  dextrose 50% Injectable 12.5 Gram(s) IV Push once  dextrose 50% Injectable 25 Gram(s) IV Push once  dextrose 50% Injectable 25 Gram(s) IV Push once  donepezil 5 milliGRAM(s) Oral at bedtime  furosemide   Injectable 20 milliGRAM(s) IV Push daily  gabapentin 300 milliGRAM(s) Oral at bedtime  glucagon  Injectable 1 milliGRAM(s) IntraMuscular once  heparin   Injectable 5000 Unit(s) SubCutaneous every 8 hours  hydrOXYzine hydrochloride Syrup 10 milliGRAM(s) Oral daily  insulin glargine Injectable (LANTUS) 60 Unit(s) SubCutaneous at bedtime  insulin lispro (ADMELOG) corrective regimen sliding scale   SubCutaneous at bedtime  insulin lispro (ADMELOG) corrective regimen sliding scale   SubCutaneous three times a day before meals  losartan 100 milliGRAM(s) Oral daily  metolazone 5 milliGRAM(s) Oral daily  metoprolol succinate  milliGRAM(s) Oral daily  pantoprazole    Tablet 40 milliGRAM(s) Oral before breakfast  senna 2 Tablet(s) Oral at bedtime    MEDICATIONS  (PRN):  acetaminophen     Tablet .. 650 milliGRAM(s) Oral every 6 hours PRN Temp greater or equal to 38C (100.4F), Moderate Pain (4 - 6)  dextrose Oral Gel 15 Gram(s) Oral once PRN Blood Glucose LESS THAN 70 milliGRAM(s)/deciliter  oxyCODONE    IR 5 milliGRAM(s) Oral every 6 hours PRN Severe Pain (7 - 10)      Allergies    latex (Unknown)  Plastic tape (Rash)  No Known Drug Allergies    Intolerances        SOCIAL HISTORY:  / /single/ ; (+)HHA/ lives in SNF; Former smoker, No current/ Denies smoking, ETOH, drugs    FAMILY HISTORY:   no h/o PVD or wound healing or skin/ significant problems    PHYSICAL EXAM:  Vital Signs Last 24 Hrs  T(C): 36.4 (05 Jun 2023 11:50), Max: 36.7 (04 Jun 2023 20:32)  T(F): 97.6 (05 Jun 2023 11:50), Max: 98.1 (04 Jun 2023 20:32)  HR: 75 (05 Jun 2023 11:50) (68 - 75)  BP: 119/71 (05 Jun 2023 11:50) (119/71 - 165/80)  BP(mean): --  RR: 18 (05 Jun 2023 11:50) (18 - 18)  SpO2: 91% (05 Jun 2023 11:50) (91% - 96%)    Parameters below as of 05 Jun 2023 11:50  Patient On (Oxygen Delivery Method): room air        NAD, Guarded but stable,  A&Ox3/ Alert/ Confused  cachectic/ thin, MO/ Obese, frail,  WD/ WN/ WG,  Disheveled  Total Care Sport/ Versa Care P500 / Envella Progressa bed     HEENT:  NC/AT, PERRL, EOMI, sclera clear, mucosa moist, throat clear, trachea midline, neck supple, trach  Respiratory: nonlabored w/ equal chest rise  Gastrointestinal: soft NT/ND (+)BS  (+)PEG (+)ostomy (+)NGT  : (+)horner/ purewick/ condom cath  Neurology:  weakened strength & sensation grossly intact, paraesthesia  nonverbal, no follow commands, paraplegic  Psych: calm/ appropriate/ flat affect/ easily agitated/ restless/ anxious/ difficult to assess  Musculoskeletal:  limited stiff / p/FROM, no deformities/ contractures  Vascular: BLE equally warm/ cool,  no cyanosis, clubbing, edema nor acute ischemia           >LE //BLE edema equal           BLE DP/PT pulses palpable          BLE hemosiderin staining/ varicose veins  Skin:  moist w/ good turgor  thin, dry, pale, frail,  ecchymosis w/o hematoma  blistering  or serosanguinous drainage  No odor, erythema, increased warmth, tenderness, induration, fluctuance, nor crepitus    LABS/ CULTURES/ RADIOLOGY:                        12.4   7.12  )-----------( 248      ( 05 Jun 2023 06:01 )             36.3       140  |  99  |  13  ----------------------------<  106      [06-05-23 @ 06:00]  3.2   |  27  |  0.82        Ca     9.1     [06-05-23 @ 06:00]      Mg     1.5     [06-05-23 @ 06:00]      Phos  4.5     [06-05-23 @ 06:00]    TPro  7.0  /  Alb  3.8  /  TBili  0.3  /  DBili  x   /  AST  20  /  ALT  25  /  AlkPhos  99  [06-05-23 @ 06:00]            A1C with Estimated Average Glucose Result: 8.8 % (06-04-23 @ 06:50)              < from: [facility] Duplex Lower Ext Vein Scan, Bilat (06.04.23 @ 10:49) >  ACC: [id number] EXAM:  DUPLEX SCAN EXT VEINS LOWER BI   ORDERED BY:   MICHOACANO GRANDAOS     PROCEDURE DATE:  06/04/2023          INTERPRETATION:  CLINICAL INFORMATION: Right leg pain, redness, swelling    COMPARISON: None available.    TECHNIQUE: Duplex sonography of the BILATERAL LOWER extremities with   color and spectral Doppler, with and without compression.    FINDINGS:    There is normal compressibility of the bilateral common femoral, femoral   and popliteal veins. No calf vein thrombosis is detected. Doppler   examination shows normal spontaneous and phasic flow. Bilateral lower   extremity soft tissue edema.    IMPRESSION:    No evidence of bilateral lower extremity deep venous thrombosis.    Bilateral lower extremity soft tissue edema. Correlate clinically.    < end of copied text >    < from: Xray Tibia + Fibula 2 Views, Right (06.04.23 @ 04:03) >    ACC: [id number] EXAM:  XR TIB FIB AP LAT 2 VIEWS RT   ORDERED BY: STEFFANIE MUNOZ     PROCEDURE DATE:  06/04/2023          INTERPRETATION:  CLINICAL INDICATION: Cellulitis    TECHNIQUE: 3 images and 2 views of the right tib-fib    COMPARISON: X-ray tib-fib 1/15/2019.    FINDINGS:  Chronically malaligned but healed fracture deformity of the midshaft of   the right fibula. Additional cortical deformity of the distal fibula.  Medial knee joint space loss. Bilateral marginal osteophytes of the knee.  There are no lytic or blastic lesions. No soft tissue gas tracking.  Vascular calcifications.    IMPRESSION:  No changes consistent with osteomyelitis.      < end of copied text >          A/P:     Wound Consult requested to assist w/ management of RLE wound    RLE - medihoney dressing QD  BLE elevation & Compression  BLE Duplex no DVT    Xray no fx/ disloc, FB, or OM  Abx per Medicine  Moisturize intact skin w/ SWEEN cream BID  Nutrition Consult for optimization        encourage high quality protein,MVI & Vit C to promote wound healing  Continue turning and positioning w/ offloading assistive devices as per protocol  Buttocks/ Sacrum BazaBID  and prn soiling        Continue w/ attends under pads and Pericare as per protocol  Waffle Cushion to chair when oob to chair  Continue w/ low air loss pressure redistribution bed surface   Care as per medicine, will follow w/ you  Upon discharge f/u as outpatient at [facility] [address] [place] [address] [phone]  Seen w/ attng and D/w team & RN  Thank you for this consult  Sabina Simons PA-C CWS [id number]  Nights/ Weekends/ Holidays please call:  General Surgery Consult pager [phone]) for emergencies  Wound PT for multilayer leg wrapping or VAC issues (x [phone])   I spent 55minutes face to face w/ this pt of which more than 50% of the time was spent counseling & coordinating care of this pt.    Wound SURGERY CONSULT NOTE    HPI:    83M with insulin [id number], HTN, HLD, CAD (s/p stent) gout, OA who presents with R LE swelling and pain x 4days. States developed pain and warmth to the R lower extremity which progressively worsened and led to ulceration and drainage of "water" described as thick and white. Denies recent travel, sick contacts, antibiotic use, hospitalizations. Denies associated fevers, chills, abdominal pain. Does report that he's had some increased secretions and coughing up phlegm recently which improved with a percussion vest.  ED course notable for normal vitals, normal labs including proBNP <36, CXR with patchy perihilar opacities; POCUS performed in the ED was with a reportedly normal EF and no B lines.   Wound consult requested by team to assist w/ management of RLE  wound. No difficulty in ambulating.  Pt w/o c/o pain, drainage, odor, color change,  or worsening swelling. Offloading and pericare initiated upon admission.   No h/o bites, scratches, falls, trauma.  Pt seen by Wound medihoney/DSD recommended while awaiting consult.  Appetite good w/o weight loss.       Current Diet: Diet, Consistent Carbohydrate/No Snacks:   DASH/TLC Sodium & Cholesterol Restricted (DASH)  Kosher (06-04-23 @ 11:40)      PAST MEDICAL & SURGICAL HISTORY:  Diabetes    Hypertension    Hyperlipidemia    CAD (coronary artery disease)    Gout    BPH (benign prostatic hyperplasia)    S/P primary angioplasty with coronary stent  4/2013- one stent    S/P Rt  hip replacement    Fracture, Lt ankle    S/ p cataract extraction  left eye done on 7/17/2019      REVIEW OF SYSTEMS: General/  Skin/Vasc/ MSK: see HPI  All other systems negative    MEDICATIONS  (STANDING):  allopurinol 300 milliGRAM(s) Oral daily  aspirin  chewable 81 milliGRAM(s) Oral daily  cefTRIAXone   IVPB 1000 milliGRAM(s) IV Intermittent every 24 hours  chlorhexidine 2% Cloths 1 Application(s) Topical <User Schedule>  cholecalciferol 1000 Unit(s) Oral daily  clopidogrel Tablet 75 milliGRAM(s) Oral daily  dextrose 5%. 1000 milliLiter(s) (50 mL/Hr) IV Continuous <Continuous>  dextrose 5%. 1000 milliLiter(s) (100 mL/Hr) IV Continuous <Continuous>  dextrose 50% Injectable 12.5 Gram(s) IV Push once  dextrose 50% Injectable 25 Gram(s) IV Push once  dextrose 50% Injectable 25 Gram(s) IV Push once  donepezil 5 milliGRAM(s) Oral at bedtime  furosemide   Injectable 20 milliGRAM(s) IV Push daily  gabapentin 300 milliGRAM(s) Oral at bedtime  glucagon  Injectable 1 milliGRAM(s) IntraMuscular once  heparin   Injectable 5000 Unit(s) SubCutaneous every 8 hours  hydrOXYzine hydrochloride Syrup 10 milliGRAM(s) Oral daily  insulin glargine Injectable (LANTUS) 60 Unit(s) SubCutaneous at bedtime  insulin lispro (ADMELOG) corrective regimen sliding scale   SubCutaneous at bedtime  insulin lispro (ADMELOG) corrective regimen sliding scale   SubCutaneous three times a day before meals  losartan 100 milliGRAM(s) Oral daily  metolazone 5 milliGRAM(s) Oral daily  metoprolol succinate  milliGRAM(s) Oral daily  pantoprazole    Tablet 40 milliGRAM(s) Oral before breakfast  senna 2 Tablet(s) Oral at bedtime    MEDICATIONS  (PRN):  acetaminophen     Tablet .. 650 milliGRAM(s) Oral every 6 hours PRN Temp greater or equal to 38C (100.4F), Moderate Pain (4 - 6)  dextrose Oral Gel 15 Gram(s) Oral once PRN Blood Glucose LESS THAN 70 milliGRAM(s)/deciliter  oxyCODONE    IR 5 milliGRAM(s) Oral every 6 hours PRN Severe Pain (7 - 10)      Allergies  latex (Unknown)  Plastic tape (Rash)      SOCIAL HISTORY:  / Denies smoking, ETOH, drugs    FAMILY HISTORY: no h/o significant problems    PHYSICAL EXAM:  Vital Signs Last 24 Hrs  T(C): 36.4 (05 Jun 2023 11:50), Max: 36.7 (04 Jun 2023 20:32)  T(F): 97.6 (05 Jun 2023 11:50), Max: 98.1 (04 Jun 2023 20:32)  HR: 75 (05 Jun 2023 11:50) (68 - 75)  BP: 119/71 (05 Jun 2023 11:50) (119/71 - 165/80)  BP(mean): --  RR: 18 (05 Jun 2023 11:50) (18 - 18)  SpO2: 91% (05 Jun 2023 11:50) (91% - 96%)    Parameters below as of 05 Jun 2023 11:50  Patient On (Oxygen Delivery Method): room air      NAD,  A&Ox3, sleepy, but arousable, Obese,WD/ WN/ WG  Versa Care P500 bed  HEENT:  NC/AT,  EOMI, sclera clear, mucosa moist, throat clear, trachea midline, neck supple  Respiratory: nonlabored w/ equal chest rise  Gastrointestinal: soft NT/ND   Neurology:   strength & sensation grossly intact  Psych: calm/ appropriate  Musculoskeletal: FROM, no deformities/ contractures  Vascular: BLE equally warm,  no cyanosis, clubbing, nor acute ischemia          R >LLEedema           BLE DP pulses palpable          BLE hemosiderin staining  RLE lateral leg w/ partial thickness wound w/ scant slough  4cm x 2.5cm x 0.1cm  No blistering    (+) serosanguinous drainage  No odor, erythema, increased warmth, tenderness, induration, fluctuance, nor crepitus  Skin:  moist w/ good turgor  thin, dry, pale, frail,  ecchymosis w/o hematoma      LABS/ CULTURES/ RADIOLOGY:                        12.4   7.12  )-----------( 248      ( 05 Jun 2023 06:01 )             36.3       140  |  99  |  13  ----------------------------<  106      [06-05-23 @ 06:00]  3.2   |  27  |  0.82        Ca     9.1     [06-05-23 @ 06:00]      Mg     1.5     [06-05-23 @ 06:00]      Phos  4.5     [06-05-23 @ 06:00]    TPro  7.0  /  Alb  3.8  /  TBili  0.3  /  DBili  x   /  AST  20  /  ALT  25  /  AlkPhos  99  [06-05-23 @ 06:00]            A1C with Estimated Average Glucose Result: 8.8 % (06-04-23 @ 06:50)              < from: [facility] Duplex Lower Ext Vein Scan, Bilat (06.04.23 @ 10:49) >  ACC: [id number] EXAM:  DUPLEX SCAN EXT VEINS LOWER BI   ORDERED BY:   MICHOACANO GRANADOS     PROCEDURE DATE:  06/04/2023          INTERPRETATION:  CLINICAL INFORMATION: Right leg pain, redness, swelling    COMPARISON: None available.    TECHNIQUE: Duplex sonography of the BILATERAL LOWER extremities with   color and spectral Doppler, with and without compression.    FINDINGS:    There is normal compressibility of the bilateral common femoral, femoral   and popliteal veins. No calf vein thrombosis is detected. Doppler   examination shows normal spontaneous and phasic flow. Bilateral lower   extremity soft tissue edema.    IMPRESSION:    No evidence of bilateral lower extremity deep venous thrombosis.    Bilateral lower extremity soft tissue edema. Correlate clinically.    < end of copied text >    < from: Xray Tibia + Fibula 2 Views, Right (06.04.23 @ 04:03) >    ACC: [id number] EXAM:  XR TIB FIB AP LAT 2 VIEWS RT   ORDERED BY: STEFFANIE MUNOZ     PROCEDURE DATE:  06/04/2023          INTERPRETATION:  CLINICAL INDICATION: Cellulitis    TECHNIQUE: 3 images and 2 views of the right tib-fib    COMPARISON: X-ray tib-fib 1/15/2019.    FINDINGS:  Chronically malaligned but healed fracture deformity of the midshaft of   the right fibula. Additional cortical deformity of the distal fibula.  Medial knee joint space loss. Bilateral marginal osteophytes of the knee.  There are no lytic or blastic lesions. No soft tissue gas tracking.  Vascular calcifications.    IMPRESSION:  No changes consistent with osteomyelitis.      < end of copied text >           Wound SURGERY CONSULT NOTE    HPI:    83M with insulin [id number], HTN, HLD, CAD (s/p stent) gout, OA who presents with R LE swelling and pain x 4days. States developed pain and warmth to the R lower extremity which progressively worsened and led to ulceration and drainage of "water" described as thick and white. Denies recent travel, sick contacts, antibiotic use, hospitalizations. Denies associated fevers, chills, abdominal pain. Does report that he's had some increased secretions and coughing up phlegm recently which improved with a percussion vest.  ED course notable for normal vitals, normal labs including proBNP <36, CXR with patchy perihilar opacities; POCUS performed in the ED was with a reportedly normal EF and no B lines.   Wound consult requested by team to assist w/ management of RLE  wound. No difficulty in ambulating.  Pt w/o c/o pain, drainage, odor, color change,  or worsening swelling. Offloading and pericare initiated upon admission.   No h/o bites, scratches, falls, trauma.  Pt seen by Wound PT medihoney/DSD recommended while awaiting consult.  Appetite good w/o weight loss.       Current Diet: Diet, Consistent Carbohydrate/No Snacks:   DASH/TLC Sodium & Cholesterol Restricted (DASH)  Kosher (06-04-23 @ 11:40)      PAST MEDICAL & SURGICAL HISTORY:  Diabetes    Hypertension    Hyperlipidemia    CAD (coronary artery disease)    Gout    BPH (benign prostatic hyperplasia)    S/P primary angioplasty with coronary stent  4/2013- one stent    S/P Rt  hip replacement    Fracture, Lt ankle    S/ p cataract extraction  left eye done on 7/17/2019      REVIEW OF SYSTEMS: General/  Skin/Vasc/ MSK: see HPI  All other systems negative    MEDICATIONS  (STANDING):  allopurinol 300 milliGRAM(s) Oral daily  aspirin  chewable 81 milliGRAM(s) Oral daily  cefTRIAXone   IVPB 1000 milliGRAM(s) IV Intermittent every 24 hours  chlorhexidine 2% Cloths 1 Application(s) Topical <User Schedule>  cholecalciferol 1000 Unit(s) Oral daily  clopidogrel Tablet 75 milliGRAM(s) Oral daily  dextrose 5%. 1000 milliLiter(s) (50 mL/Hr) IV Continuous <Continuous>  dextrose 5%. 1000 milliLiter(s) (100 mL/Hr) IV Continuous <Continuous>  dextrose 50% Injectable 12.5 Gram(s) IV Push once  dextrose 50% Injectable 25 Gram(s) IV Push once  dextrose 50% Injectable 25 Gram(s) IV Push once  donepezil 5 milliGRAM(s) Oral at bedtime  furosemide   Injectable 20 milliGRAM(s) IV Push daily  gabapentin 300 milliGRAM(s) Oral at bedtime  glucagon  Injectable 1 milliGRAM(s) IntraMuscular once  heparin   Injectable 5000 Unit(s) SubCutaneous every 8 hours  hydrOXYzine hydrochloride Syrup 10 milliGRAM(s) Oral daily  insulin glargine Injectable (LANTUS) 60 Unit(s) SubCutaneous at bedtime  insulin lispro (ADMELOG) corrective regimen sliding scale   SubCutaneous at bedtime  insulin lispro (ADMELOG) corrective regimen sliding scale   SubCutaneous three times a day before meals  losartan 100 milliGRAM(s) Oral daily  metolazone 5 milliGRAM(s) Oral daily  metoprolol succinate  milliGRAM(s) Oral daily  pantoprazole    Tablet 40 milliGRAM(s) Oral before breakfast  senna 2 Tablet(s) Oral at bedtime    MEDICATIONS  (PRN):  acetaminophen     Tablet .. 650 milliGRAM(s) Oral every 6 hours PRN Temp greater or equal to 38C (100.4F), Moderate Pain (4 - 6)  dextrose Oral Gel 15 Gram(s) Oral once PRN Blood Glucose LESS THAN 70 milliGRAM(s)/deciliter  oxyCODONE    IR 5 milliGRAM(s) Oral every 6 hours PRN Severe Pain (7 - 10)      Allergies  latex (Unknown)  Plastic tape (Rash)      SOCIAL HISTORY:  / Denies smoking, ETOH, drugs    FAMILY HISTORY: no h/o significant problems    PHYSICAL EXAM:  Vital Signs Last 24 Hrs  T(C): 36.4 (05 Jun 2023 11:50), Max: 36.7 (04 Jun 2023 20:32)  T(F): 97.6 (05 Jun 2023 11:50), Max: 98.1 (04 Jun 2023 20:32)  HR: 75 (05 Jun 2023 11:50) (68 - 75)  BP: 119/71 (05 Jun 2023 11:50) (119/71 - 165/80)  BP(mean): --  RR: 18 (05 Jun 2023 11:50) (18 - 18)  SpO2: 91% (05 Jun 2023 11:50) (91% - 96%)    Parameters below as of 05 Jun 2023 11:50  Patient On (Oxygen Delivery Method): room air      NAD,  A&Ox3, sleepy, but arousable, Obese,WD/ WN/ WG  Versa Care P500 bed  HEENT:  NC/AT,  EOMI, sclera clear, mucosa moist, throat clear, trachea midline, neck supple  Respiratory: nonlabored w/ equal chest rise  Gastrointestinal: soft NT/ND   Neurology:   strength & sensation grossly intact  Psych: calm/ appropriate  Musculoskeletal: FROM, no deformities/ contractures  Vascular: BLE equally warm,  no cyanosis, clubbing, nor acute ischemia          R >LLEedema           BLE DP pulses palpable          BLE hemosiderin staining  RLE lateral leg w/wound w/ scant slough  4cm x 2.5cm x 0.1cm  No blistering    (+) serosanguinous drainage  No odor, erythema, increased warmth, tenderness, induration, fluctuance, nor crepitus  Skin:  moist w/ good turgor  thin, dry, pale, frail,  ecchymosis w/o hematoma      LABS/ CULTURES/ RADIOLOGY:                        12.4   7.12  )-----------( 248      ( 05 Jun 2023 06:01 )             36.3       140  |  99  |  13  ----------------------------<  106      [06-05-23 @ 06:00]  3.2   |  27  |  0.82        Ca     9.1     [06-05-23 @ 06:00]      Mg     1.5     [06-05-23 @ 06:00]      Phos  4.5     [06-05-23 @ 06:00]    TPro  7.0  /  Alb  3.8  /  TBili  0.3  /  DBili  x   /  AST  20  /  ALT  25  /  AlkPhos  99  [06-05-23 @ 06:00]            A1C with Estimated Average Glucose Result: 8.8 % (06-04-23 @ 06:50)              < from: [facility] Duplex Lower Ext Vein Scan, Bilat (06.04.23 @ 10:49) >  ACC: [id number] EXAM:  DUPLEX SCAN EXT VEINS LOWER BI   ORDERED BY:   MICHOACANO GRANADOS     PROCEDURE DATE:  06/04/2023          INTERPRETATION:  CLINICAL INFORMATION: Right leg pain, redness, swelling    COMPARISON: None available.    TECHNIQUE: Duplex sonography of the BILATERAL LOWER extremities with   color and spectral Doppler, with and without compression.    FINDINGS:    There is normal compressibility of the bilateral common femoral, femoral   and popliteal veins. No calf vein thrombosis is detected. Doppler   examination shows normal spontaneous and phasic flow. Bilateral lower   extremity soft tissue edema.    IMPRESSION:    No evidence of bilateral lower extremity deep venous thrombosis.    Bilateral lower extremity soft tissue edema. Correlate clinically.    < end of copied text >    < from: Xray Tibia + Fibula 2 Views, Right (06.04.23 @ 04:03) >    ACC: [id number] EXAM:  XR TIB FIB AP LAT 2 VIEWS RT   ORDERED BY: STEFFANIE MUNOZ     PROCEDURE DATE:  06/04/2023          INTERPRETATION:  CLINICAL INDICATION: Cellulitis    TECHNIQUE: 3 images and 2 views of the right tib-fib    COMPARISON: X-ray tib-fib 1/15/2019.    FINDINGS:  Chronically malaligned but healed fracture deformity of the midshaft of   the right fibula. Additional cortical deformity of the distal fibula.  Medial knee joint space loss. Bilateral marginal osteophytes of the knee.  There are no lytic or blastic lesions. No soft tissue gas tracking.  Vascular calcifications.    IMPRESSION:  No changes consistent with osteomyelitis.      < end of copied text >

## 2023-06-05 NOTE — CONSULT NOTE ADULT - ASSESSMENT
A/P: 83 Farsi speaking M with IDDM, HTN, HLD, CAD s/p PCI, gout, OA who presents with RLE cellulitis requiring IV abx.     Wound Consult requested to assist w/ management of RLE wound  RLE cellulitis    RLE - medihoney dressing QD  BLE elevation & Compression  BLE Duplex no DVT    Xray no fx/ disloc, FB, or OM  Abx per Medicine  Moisturize intact skin w/ SWEEN cream BID  Nutrition Consult for optimization        encourage high quality protein,MVI & Vit C to promote wound healing  Continue turning and positioning w/ offloading assistive devices as per protocol  Buttocks/ Sacrum BazaBID and prn soiling        Continue w/ attends under pads and Pericare as per protocol  Waffle Cushion to chair when oob to chair  Continue w/ low air loss pressure redistribution bed surface   Care as per medicine, will follow w/ you  Upon discharge f/u as outpatient at [facility] [address] [place], [place] [phone]  Seen w/ attng and D/w team & RN  Thank you for this consult  Sabina Simons PA-C [id number]  Nights/ Weekends/ Holidays please call:  General Surgery Consult pager [phone]) for emergencies  Wound PT for multilayer leg wrapping or VAC issues ([phone])   I spent 55minutes face to face w/ this pt of which more than 50% of the time was spent counseling & coordinating care of this pt.    A/P: 83 Farsi speaking M with IDDM, HTN, HLD, CAD s/p PCI, gout, OA who presents with RLE cellulitis requiring IV abx.     Wound Consult requested to assist w/ management of:  RLE wound  RLE cellulitis    RLE - medihoney dressing QD  BLE elevation & Compression  BLE Duplex no DVT    Xray no fx/ disloc, FB, or OM  Abx per Medicine  Moisturize intact skin w/ SWEEN cream BID  Nutrition Consult for optimization        encourage high quality protein,MVI & Vit C to promote wound healing  Continue turning and positioning w/ offloading assistive devices as per protocol  Buttocks/ Sacrum BazaBID and prn soiling        Continue w/ attends under pads and Pericare as per protocol  Waffle Cushion to chair when oob to chair  Continue w/ low air loss pressure redistribution bed surface   Care as per medicine, will follow w/ you  Upon discharge f/u as outpatient at [facility] [address] [place], [place] [phone]  Seen w/ attng and D/w team & RN  Thank you for this consult  Sabina Simons PA-C [id number]  Nights/ Weekends/ Holidays please call:  General Surgery Consult pager [phone]) for emergencies  Wound PT for multilayer leg wrapping or VAC issues (x [phone])

## 2023-06-05 NOTE — PROGRESS NOTE ADULT - PROBLEM SELECTOR PLAN 4
> continue home ASA and plavix  > consider starting statin after clarifying with son reasons for not being on it - bilateral pitting LE edema, possibly venous statis vs HF  - normal pro-BNP but CXR with possible pulmonary edema  - c/w home metolazone 5mg QD  - c/w lasix 20 QD  - f/u TTE

## 2023-06-05 NOTE — CONSULT NOTE ADULT - NS ATTEND AMEND GEN_ALL_CORE FT
Pt seen and examined with ACP.  Assessment and plan reviewed and discussed.  Agree with above.      I spent 55   minutes face to face w/ this pt of which more than 50% of the time was spent counseling & coordinating care of this pt.

## 2023-06-05 NOTE — PROGRESS NOTE ADULT - PROBLEM SELECTOR PLAN 7
- DVT ppx: SQH  - diet: CC, DASH/TLC  - dispo: pending medical course  - code status: full - on home naproxen 500mg qd and tylenol qd  > PRN tylenol and naproxen

## 2023-06-05 NOTE — PROGRESS NOTE ADULT - PROBLEM SELECTOR PLAN 6
- on home naproxen 500mg qd and tylenol qd  > PRN tylenol and naproxen - continue home losartan and metoprolol

## 2023-06-05 NOTE — PROGRESS NOTE ADULT - SUBJECTIVE AND OBJECTIVE BOX
***************************************************************  Cade Overton, PGY1  Internal Medicine   TEAMS Preferred  ***************************************************************    CHETNA MENDOZA  83y  MRN: [id number]  06-03-23 (2d)    Patient is a 83y old  Male who presents with a chief complaint of Cellulitis (04 Jun 2023 18:54)      SUBJECTIVE / OVERNIGHT EVENTS:   No acute overnight events. Pt seen and examined at bedside. Denies fevers, chills, CP, SOB, Abdominal pain, N/V, Constipation, Diarrhea. Last BM     12 Point ROS negative with the exception of the above    MEDICATIONS  (STANDING):  allopurinol 300 milliGRAM(s) Oral daily  aspirin  chewable 81 milliGRAM(s) Oral daily  cefTRIAXone   IVPB 1000 milliGRAM(s) IV Intermittent every 24 hours  cholecalciferol 1000 Unit(s) Oral daily  clopidogrel Tablet 75 milliGRAM(s) Oral daily  dextrose 5%. 1000 milliLiter(s) (100 mL/Hr) IV Continuous <Continuous>  dextrose 5%. 1000 milliLiter(s) (50 mL/Hr) IV Continuous <Continuous>  dextrose 50% Injectable 25 Gram(s) IV Push once  dextrose 50% Injectable 12.5 Gram(s) IV Push once  dextrose 50% Injectable 25 Gram(s) IV Push once  donepezil 5 milliGRAM(s) Oral at bedtime  furosemide   Injectable 20 milliGRAM(s) IV Push daily  gabapentin 300 milliGRAM(s) Oral at bedtime  glucagon  Injectable 1 milliGRAM(s) IntraMuscular once  heparin   Injectable 5000 Unit(s) SubCutaneous every 8 hours  hydrOXYzine hydrochloride Syrup 10 milliGRAM(s) Oral daily  insulin glargine Injectable (LANTUS) 60 Unit(s) SubCutaneous at bedtime  insulin lispro (ADMELOG) corrective regimen sliding scale   SubCutaneous at bedtime  insulin lispro (ADMELOG) corrective regimen sliding scale   SubCutaneous three times a day before meals  losartan 100 milliGRAM(s) Oral daily  metolazone 5 milliGRAM(s) Oral daily  metoprolol succinate  milliGRAM(s) Oral daily  pantoprazole    Tablet 40 milliGRAM(s) Oral before breakfast  senna 2 Tablet(s) Oral at bedtime    MEDICATIONS  (PRN):  dextrose Oral Gel 15 Gram(s) Oral once PRN Blood Glucose LESS THAN 70 milliGRAM(s)/deciliter  oxyCODONE    IR 5 milliGRAM(s) Oral every 6 hours PRN Severe Pain (7 - 10)      OBJECTIVE:  Vital Signs Last 24 Hrs  T(C): 36.3 (05 Jun 2023 05:14), Max: 36.7 (04 Jun 2023 20:32)  T(F): 97.4 (05 Jun 2023 05:14), Max: 98.1 (04 Jun 2023 20:32)  HR: 68 (05 Jun 2023 05:14) (68 - 74)  BP: 165/80 (05 Jun 2023 05:14) (144/77 - 165/80)  BP(mean): --  RR: 18 (05 Jun 2023 05:14) (17 - 19)  SpO2: 94% (05 Jun 2023 05:14) (92% - 96%)    Parameters below as of 05 Jun 2023 05:14  Patient On (Oxygen Delivery Method): room air        I&O's Summary    03 Jun 2023 07:01  -  04 Jun 2023 07:00  --------------------------------------------------------  IN: 100 mL / OUT: 800 mL / NET: -700 mL    04 Jun 2023 07:01  -  05 Jun 2023 06:42  --------------------------------------------------------  IN: 970 mL / OUT: 1100 mL / NET: -130 mL        PHYSICAL EXAM:  GENERAL: Laying comfortably, NAD  HEENT: NCAT, PERRLA, EOMI, no scleral icterus, no LAD  NECK: No JVD, supple  LUNG: CTABL; No wheezes, crackles, or rhonchi  HEART: RRR; normal S1/S2; No murmurs, rubs, or gallops  ABDOMEN: +BS, soft, nontender, nondistended, no HSM; No rebound, guarding, or rigidity  EXTREMITIES:  No LE edema b/l, 2+ Peripheral Pulses, No clubbing or cyanosis  NEUROLOGY: AOx3, non-focal, strength 5/5 in all extremities, sensation intact  PSYCH: calm and cooperative  SKIN: No rashes or lesions    LABS:                        12.4   7.12  )-----------( 248      ( 05 Jun 2023 06:01 )             36.3     Auto Eosinophil # x     / Auto Eosinophil % x     / Auto Neutrophil # x     / Auto Neutrophil % x     / BANDS % x                            12.0   7.93  )-----------( 247      ( 04 Jun 2023 06:50 )             36.4     Auto Eosinophil # 0.36  / Auto Eosinophil % 4.5   / Auto Neutrophil # 4.33  / Auto Neutrophil % 54.6  / BANDS % x                            12.6   8.50  )-----------( 269      ( 03 Jun 2023 20:42 )             37.7     Auto Eosinophil # 0.28  / Auto Eosinophil % 3.3   / Auto Neutrophil # 4.74  / Auto Neutrophil % 55.8  / BANDS % x        06-04    139  |  98  |  15  ----------------------------<  110<H>  3.7   |  26  |  0.68  06-03    139  |  99  |  17  ----------------------------<  96  4.3   |  29  |  0.79    Ca    9.0      04 Jun 2023 06:50  Ca    9.8      03 Jun 2023 20:43  Phos  3.7     06-04  Mg     1.6     06-04    TPro  7.0  /  Alb  3.9  /  TBili  0.3  /  DBili  x   /  AST  24  /  ALT  28  /  AlkPhos  95  06-04  TPro  7.8  /  Alb  4.4  /  TBili  0.2  /  DBili  x   /  AST  28  /  ALT  33  /  AlkPhos  107  06-03                CAPILLARY BLOOD GLUCOSE      POCT Blood Glucose.: 157 mg/dL (04 Jun 2023 21:12)  POCT Blood Glucose.: 135 mg/dL (04 Jun 2023 16:31)  POCT Blood Glucose.: 190 mg/dL (04 Jun 2023 10:59)  POCT Blood Glucose.: 126 mg/dL (04 Jun 2023 07:28)        RADIOLOGY & ADDITIONAL TESTS:     ***************************************************************  Cade Overton, PGY1  Internal Medicine   TEAMS Preferred  ***************************************************************    CHETNA MENDOZA  83y  MRN: [id number]  06-03-23 (2d)    Patient is a 83y old  Male who presents with a chief complaint of Cellulitis (04 Jun 2023 18:54)      SUBJECTIVE / OVERNIGHT EVENTS:   Rusty  - Carmen [phone]  No acute overnight events. Pt seen and examined at bedside. Reports 5/10 RLE pain. Feels much better today. Denies fevers, chills, CP, SOB, Abdominal pain, N/V, Constipation, Diarrhea.     12 Point ROS negative with the exception of the above    MEDICATIONS  (STANDING):  allopurinol 300 milliGRAM(s) Oral daily  aspirin  chewable 81 milliGRAM(s) Oral daily  cefTRIAXone   IVPB 1000 milliGRAM(s) IV Intermittent every 24 hours  cholecalciferol 1000 Unit(s) Oral daily  clopidogrel Tablet 75 milliGRAM(s) Oral daily  dextrose 5%. 1000 milliLiter(s) (100 mL/Hr) IV Continuous <Continuous>  dextrose 5%. 1000 milliLiter(s) (50 mL/Hr) IV Continuous <Continuous>  dextrose 50% Injectable 25 Gram(s) IV Push once  dextrose 50% Injectable 12.5 Gram(s) IV Push once  dextrose 50% Injectable 25 Gram(s) IV Push once  donepezil 5 milliGRAM(s) Oral at bedtime  furosemide   Injectable 20 milliGRAM(s) IV Push daily  gabapentin 300 milliGRAM(s) Oral at bedtime  glucagon  Injectable 1 milliGRAM(s) IntraMuscular once  heparin   Injectable 5000 Unit(s) SubCutaneous every 8 hours  hydrOXYzine hydrochloride Syrup 10 milliGRAM(s) Oral daily  insulin glargine Injectable (LANTUS) 60 Unit(s) SubCutaneous at bedtime  insulin lispro (ADMELOG) corrective regimen sliding scale   SubCutaneous at bedtime  insulin lispro (ADMELOG) corrective regimen sliding scale   SubCutaneous three times a day before meals  losartan 100 milliGRAM(s) Oral daily  metolazone 5 milliGRAM(s) Oral daily  metoprolol succinate  milliGRAM(s) Oral daily  pantoprazole    Tablet 40 milliGRAM(s) Oral before breakfast  senna 2 Tablet(s) Oral at bedtime    MEDICATIONS  (PRN):  dextrose Oral Gel 15 Gram(s) Oral once PRN Blood Glucose LESS THAN 70 milliGRAM(s)/deciliter  oxyCODONE    IR 5 milliGRAM(s) Oral every 6 hours PRN Severe Pain (7 - 10)      OBJECTIVE:  Vital Signs Last 24 Hrs  T(C): 36.3 (05 Jun 2023 05:14), Max: 36.7 (04 Jun 2023 20:32)  T(F): 97.4 (05 Jun 2023 05:14), Max: 98.1 (04 Jun 2023 20:32)  HR: 68 (05 Jun 2023 05:14) (68 - 74)  BP: 165/80 (05 Jun 2023 05:14) (144/77 - 165/80)  BP(mean): --  RR: 18 (05 Jun 2023 05:14) (17 - 19)  SpO2: 94% (05 Jun 2023 05:14) (92% - 96%)    Parameters below as of 05 Jun 2023 05:14  Patient On (Oxygen Delivery Method): room air        I&O's Summary    03 Jun 2023 07:01  -  04 Jun 2023 07:00  --------------------------------------------------------  IN: 100 mL / OUT: 800 mL / NET: -700 mL    04 Jun 2023 07:01  -  05 Jun 2023 06:42  --------------------------------------------------------  IN: 970 mL / OUT: 1100 mL / NET: -130 mL        PHYSICAL EXAM:  GENERAL: Laying comfortably, NAD  HEENT: NCAT, PERRLA, EOMI, no scleral icterus, no LAD  LUNG: CTABL; No wheezes, crackles, or rhonchi  HEART: RRR; normal S1/S2; No murmurs, rubs, or gallops  ABDOMEN: +BS, soft, nontender, nondistended  EXTREMITIES: 1+LLE edema, RLE edema, erythema, and warmth - wrapped in ACE bandage  NEUROLOGY: AOx3, non-focal, strength 5/5 in all extremities, sensation intact  PSYCH: calm and cooperative  SKIN: No rashes or lesions    LABS:                        12.4   7.12  )-----------( 248      ( 05 Jun 2023 06:01 )             36.3     Auto Eosinophil # x     / Auto Eosinophil % x     / Auto Neutrophil # x     / Auto Neutrophil % x     / BANDS % x                            12.0   7.93  )-----------( 247      ( 04 Jun 2023 06:50 )             36.4     Auto Eosinophil # 0.36  / Auto Eosinophil % 4.5   / Auto Neutrophil # 4.33  / Auto Neutrophil % 54.6  / BANDS % x                            12.6   8.50  )-----------( 269      ( 03 Jun 2023 20:42 )             37.7     Auto Eosinophil # 0.28  / Auto Eosinophil % 3.3   / Auto Neutrophil # 4.74  / Auto Neutrophil % 55.8  / BANDS % x      06-05    140  |  99  |  13  ----------------------------<  106<H>  3.2<L>   |  27  |  0.82    Ca    9.1      05 Jun 2023 06:00  Phos  4.5     06-05  Mg     1.5     06-05    TPro  7.0  /  Alb  3.8  /  TBili  0.3  /  DBili  x   /  AST  20  /  ALT  25  /  AlkPhos  99  06-05    06-04    139  |  98  |  15  ----------------------------<  110<H>  3.7   |  26  |  0.68  06-03    139  |  99  |  17  ----------------------------<  96  4.3   |  29  |  0.79    Ca    9.0      04 Jun 2023 06:50  Ca    9.8      03 Jun 2023 20:43  Phos  3.7     06-04  Mg     1.6     06-04    TPro  7.0  /  Alb  3.9  /  TBili  0.3  /  DBili  x   /  AST  24  /  ALT  28  /  AlkPhos  95  06-04  TPro  7.8  /  Alb  4.4  /  TBili  0.2  /  DBili  x   /  AST  28  /  ALT  33  /  AlkPhos  107  06-03                CAPILLARY BLOOD GLUCOSE      POCT Blood Glucose.: 157 mg/dL (04 Jun 2023 21:12)  POCT Blood Glucose.: 135 mg/dL (04 Jun 2023 16:31)  POCT Blood Glucose.: 190 mg/dL (04 Jun 2023 10:59)  POCT Blood Glucose.: 126 mg/dL (04 Jun 2023 07:28)        RADIOLOGY & ADDITIONAL TESTS:     ***************************************************************  Cade Overton, PGY1  Internal Medicine   TEAMS Preferred  ***************************************************************    CHETNA MENDOZA  83y  MRN: [id number]  06-03-23 (2d)    Patient is a 83y old  Male who presents with a chief complaint of Cellulitis (04 Jun 2023 18:54)      SUBJECTIVE / OVERNIGHT EVENTS:   Rusty  - Carmen [phone]  No acute overnight events. Pt seen and examined at bedside. Reports 5/10 RLE pain. Feels much better today. Denies fevers, chills, CP, SOB, Abdominal pain, N/V, Constipation, Diarrhea.     12 Point ROS negative with the exception of the above    MEDICATIONS  (STANDING):  allopurinol 300 milliGRAM(s) Oral daily  aspirin  chewable 81 milliGRAM(s) Oral daily  cefTRIAXone   IVPB 1000 milliGRAM(s) IV Intermittent every 24 hours  cholecalciferol 1000 Unit(s) Oral daily  clopidogrel Tablet 75 milliGRAM(s) Oral daily  dextrose 5%. 1000 milliLiter(s) (100 mL/Hr) IV Continuous <Continuous>  dextrose 5%. 1000 milliLiter(s) (50 mL/Hr) IV Continuous <Continuous>  dextrose 50% Injectable 25 Gram(s) IV Push once  dextrose 50% Injectable 12.5 Gram(s) IV Push once  dextrose 50% Injectable 25 Gram(s) IV Push once  donepezil 5 milliGRAM(s) Oral at bedtime  furosemide   Injectable 20 milliGRAM(s) IV Push daily  gabapentin 300 milliGRAM(s) Oral at bedtime  glucagon  Injectable 1 milliGRAM(s) IntraMuscular once  heparin   Injectable 5000 Unit(s) SubCutaneous every 8 hours  hydrOXYzine hydrochloride Syrup 10 milliGRAM(s) Oral daily  insulin glargine Injectable (LANTUS) 60 Unit(s) SubCutaneous at bedtime  insulin lispro (ADMELOG) corrective regimen sliding scale   SubCutaneous at bedtime  insulin lispro (ADMELOG) corrective regimen sliding scale   SubCutaneous three times a day before meals  losartan 100 milliGRAM(s) Oral daily  metolazone 5 milliGRAM(s) Oral daily  metoprolol succinate  milliGRAM(s) Oral daily  pantoprazole    Tablet 40 milliGRAM(s) Oral before breakfast  senna 2 Tablet(s) Oral at bedtime    MEDICATIONS  (PRN):  dextrose Oral Gel 15 Gram(s) Oral once PRN Blood Glucose LESS THAN 70 milliGRAM(s)/deciliter  oxyCODONE    IR 5 milliGRAM(s) Oral every 6 hours PRN Severe Pain (7 - 10)      OBJECTIVE:  Vital Signs Last 24 Hrs  T(C): 36.3 (05 Jun 2023 05:14), Max: 36.7 (04 Jun 2023 20:32)  T(F): 97.4 (05 Jun 2023 05:14), Max: 98.1 (04 Jun 2023 20:32)  HR: 68 (05 Jun 2023 05:14) (68 - 74)  BP: 165/80 (05 Jun 2023 05:14) (144/77 - 165/80)  BP(mean): --  RR: 18 (05 Jun 2023 05:14) (17 - 19)  SpO2: 94% (05 Jun 2023 05:14) (92% - 96%)    Parameters below as of 05 Jun 2023 05:14  Patient On (Oxygen Delivery Method): room air        I&O's Summary    03 Jun 2023 07:01  -  04 Jun 2023 07:00  --------------------------------------------------------  IN: 100 mL / OUT: 800 mL / NET: -700 mL    04 Jun 2023 07:01  -  05 Jun 2023 06:42  --------------------------------------------------------  IN: 970 mL / OUT: 1100 mL / NET: -130 mL        PHYSICAL EXAM:  GENERAL: Laying comfortably, NAD  HEENT: NCAT, PERRLA, EOMI, no scleral icterus, no LAD  LUNG: CTABL; No wheezes, crackles, or rhonchi  HEART: RRR; normal S1/S2; No murmurs, rubs, or gallops  ABDOMEN: +BS, soft, nontender, nondistended  EXTREMITIES: 1+LLE edema, RLE edema, erythema, and warmth w/ ulceration - wrapped in ACE bandage  NEUROLOGY: AOx3, non-focal, strength 5/5 in all extremities, sensation intact  PSYCH: calm and cooperative  SKIN: No rashes or lesions    LABS:                        12.4   7.12  )-----------( 248      ( 05 Jun 2023 06:01 )             36.3     Auto Eosinophil # x     / Auto Eosinophil % x     / Auto Neutrophil # x     / Auto Neutrophil % x     / BANDS % x                            12.0   7.93  )-----------( 247      ( 04 Jun 2023 06:50 )             36.4     Auto Eosinophil # 0.36  / Auto Eosinophil % 4.5   / Auto Neutrophil # 4.33  / Auto Neutrophil % 54.6  / BANDS % x                            12.6   8.50  )-----------( 269      ( 03 Jun 2023 20:42 )             37.7     Auto Eosinophil # 0.28  / Auto Eosinophil % 3.3   / Auto Neutrophil # 4.74  / Auto Neutrophil % 55.8  / BANDS % x      06-05    140  |  99  |  13  ----------------------------<  106<H>  3.2<L>   |  27  |  0.82    Ca    9.1      05 Jun 2023 06:00  Phos  4.5     06-05  Mg     1.5     06-05    TPro  7.0  /  Alb  3.8  /  TBili  0.3  /  DBili  x   /  AST  20  /  ALT  25  /  AlkPhos  99  06-05    06-04    139  |  98  |  15  ----------------------------<  110<H>  3.7   |  26  |  0.68  06-03    139  |  99  |  17  ----------------------------<  96  4.3   |  29  |  0.79    Ca    9.0      04 Jun 2023 06:50  Ca    9.8      03 Jun 2023 20:43  Phos  3.7     06-04  Mg     1.6     06-04    TPro  7.0  /  Alb  3.9  /  TBili  0.3  /  DBili  x   /  AST  24  /  ALT  28  /  AlkPhos  95  06-04  TPro  7.8  /  Alb  4.4  /  TBili  0.2  /  DBili  x   /  AST  28  /  ALT  33  /  AlkPhos  107  06-03                CAPILLARY BLOOD GLUCOSE      POCT Blood Glucose.: 157 mg/dL (04 Jun 2023 21:12)  POCT Blood Glucose.: 135 mg/dL (04 Jun 2023 16:31)  POCT Blood Glucose.: 190 mg/dL (04 Jun 2023 10:59)  POCT Blood Glucose.: 126 mg/dL (04 Jun 2023 07:28)        RADIOLOGY & ADDITIONAL TESTS:

## 2023-06-05 NOTE — CONSULT NOTE ADULT - CONSULT REASON
RLE wound Nasal Turnover Hinge Flap Text: The defect edges were debeveled with a #15 scalpel blade.  Given the size, depth, location of the defect and the defect being full thickness a nasal turnover hinge flap was deemed most appropriate.  Using a sterile surgical marker, an appropriate hinge flap was drawn incorporating the defect. The area thus outlined was incised with a #15 scalpel blade. The flap was designed to recreate the nasal mucosal lining and the alar rim. The skin margins were undermined to an appropriate distance in all directions utilizing iris scissors.

## 2023-06-05 NOTE — PROGRESS NOTE ADULT - PROBLEM SELECTOR PLAN 3
- bilateral pitting LE edema, possibly venous statis vs HF  - normal pro-BNP but CXR with possible pulmonary edema  - on home metolazone  > will check TTE   > continue lasix 20mg IVP qd  > resume home metolazone - on home 75U Lantus and glyburide-metformin qid  - A1c 8.8 on admission  - c/w lantus 60u (at 80% home dose, increase PRN)  - moderate ISS  - fingersticks tidac & qhs, CC diet

## 2023-06-05 NOTE — PROGRESS NOTE ADULT - PROBLEM SELECTOR PLAN 8
- DVT ppx: SQH  - diet: CC, DASH/TLC  - dispo: pending medical course, PT recommending Home PT  - code status: full

## 2023-06-05 NOTE — PROGRESS NOTE ADULT - PROBLEM SELECTOR PLAN 1
- presenting with 3-5 day hx of RLE erythema, swelling, drainage  - XR without underlying OM, duplex negative for DVTs  > continue ceftriaxone (6/3- )  > MRSA swab, f/u cultures - presenting with 3-5 day hx of RLE erythema, swelling, drainage  - XR without underlying OM, duplex negative for DVTs  - c/w ceftriaxone (6/3- ) x7 days  - MRSA neg  - f/u cultures

## 2023-06-05 NOTE — PROGRESS NOTE ADULT - PROBLEM SELECTOR PLAN 2
- on home 75U Lantus and glyburide-metformin qid  - A1c 8.8 on admission  > start lantus at 80% home dose, increase PRN  > moderate SSI given likely insulin resistant  > fingersticks tidac & qhs, CC diet - on home 75U Lantus and glyburide-metformin qid  - A1c 8.8 on admission  - c/w lantus 60u (at 80% home dose, increase PRN  > moderate SSI given likely insulin resistant  > fingersticks tidac & qhs, CC diet Patient reporting that he is experiencing urinary incontinence with urinating at times in the bed after urinating normally  - was not on any meds for BPH at home  - will check bladder scan

## 2023-06-06 LAB
ANION GAP SERPL CALC-SCNC: 14 MMOL/L — SIGNIFICANT CHANGE UP (ref 5–17)
BUN SERPL-MCNC: 21 MG/DL — SIGNIFICANT CHANGE UP (ref 7–23)
CALCIUM SERPL-MCNC: 8.9 MG/DL — SIGNIFICANT CHANGE UP (ref 8.4–10.5)
CHLORIDE SERPL-SCNC: 99 MMOL/L — SIGNIFICANT CHANGE UP (ref 96–108)
CHOLEST SERPL-MCNC: 127 MG/DL — SIGNIFICANT CHANGE UP
CO2 SERPL-SCNC: 26 MMOL/L — SIGNIFICANT CHANGE UP (ref 22–31)
CREAT SERPL-MCNC: 0.84 MG/DL — SIGNIFICANT CHANGE UP (ref 0.5–1.3)
EGFR: 87 ML/MIN/1.73M2 — SIGNIFICANT CHANGE UP
GLUCOSE BLDC GLUCOMTR-MCNC: 139 MG/DL — HIGH (ref 70–99)
GLUCOSE BLDC GLUCOMTR-MCNC: 166 MG/DL — HIGH (ref 70–99)
GLUCOSE BLDC GLUCOMTR-MCNC: 188 MG/DL — HIGH (ref 70–99)
GLUCOSE BLDC GLUCOMTR-MCNC: 299 MG/DL — HIGH (ref 70–99)
GLUCOSE SERPL-MCNC: 142 MG/DL — HIGH (ref 70–99)
HCT VFR BLD CALC: 35.8 % — LOW (ref 39–50)
HDLC SERPL-MCNC: 24 MG/DL — LOW
HGB BLD-MCNC: 11.9 G/DL — LOW (ref 13–17)
LIPID PNL WITH DIRECT LDL SERPL: 48 MG/DL — SIGNIFICANT CHANGE UP
MAGNESIUM SERPL-MCNC: 1.6 MG/DL — SIGNIFICANT CHANGE UP (ref 1.6–2.6)
MCHC RBC-ENTMCNC: 32.3 PG — SIGNIFICANT CHANGE UP (ref 27–34)
MCHC RBC-ENTMCNC: 33.2 GM/DL — SIGNIFICANT CHANGE UP (ref 32–36)
MCV RBC AUTO: 97.3 FL — SIGNIFICANT CHANGE UP (ref 80–100)
NON HDL CHOLESTEROL: 104 MG/DL — SIGNIFICANT CHANGE UP
NRBC # BLD: 0 /100 WBCS — SIGNIFICANT CHANGE UP (ref 0–0)
PHOSPHATE SERPL-MCNC: 3.5 MG/DL — SIGNIFICANT CHANGE UP (ref 2.5–4.5)
PLATELET # BLD AUTO: 260 K/UL — SIGNIFICANT CHANGE UP (ref 150–400)
POTASSIUM SERPL-MCNC: 3.6 MMOL/L — SIGNIFICANT CHANGE UP (ref 3.5–5.3)
POTASSIUM SERPL-SCNC: 3.6 MMOL/L — SIGNIFICANT CHANGE UP (ref 3.5–5.3)
RBC # BLD: 3.68 M/UL — LOW (ref 4.2–5.8)
RBC # FLD: 13.2 % — SIGNIFICANT CHANGE UP (ref 10.3–14.5)
SODIUM SERPL-SCNC: 139 MMOL/L — SIGNIFICANT CHANGE UP (ref 135–145)
TRIGL SERPL-MCNC: 276 MG/DL — HIGH
WBC # BLD: 8.72 K/UL — SIGNIFICANT CHANGE UP (ref 3.8–10.5)
WBC # FLD AUTO: 8.72 K/UL — SIGNIFICANT CHANGE UP (ref 3.8–10.5)

## 2023-06-06 PROCEDURE — 99233 SBSQ HOSP IP/OBS HIGH 50: CPT | Mod: GC

## 2023-06-06 RX ORDER — IBUPROFEN 200 MG
400 TABLET ORAL
Refills: 0 | Status: DISCONTINUED | OUTPATIENT
Start: 2023-06-06 | End: 2023-06-09

## 2023-06-06 RX ORDER — TAMSULOSIN HYDROCHLORIDE 0.4 MG/1
0.4 CAPSULE ORAL AT BEDTIME
Refills: 0 | Status: DISCONTINUED | OUTPATIENT
Start: 2023-06-06 | End: 2023-06-09

## 2023-06-06 RX ORDER — MAGNESIUM SULFATE 500 MG/ML
2 VIAL (ML) INJECTION ONCE
Refills: 0 | Status: COMPLETED | OUTPATIENT
Start: 2023-06-06 | End: 2023-06-06

## 2023-06-06 RX ADMIN — OXYCODONE HYDROCHLORIDE 5 MILLIGRAM(S): 5 TABLET ORAL at 12:34

## 2023-06-06 RX ADMIN — Medication 81 MILLIGRAM(S): at 11:13

## 2023-06-06 RX ADMIN — LOSARTAN POTASSIUM 100 MILLIGRAM(S): 100 TABLET, FILM COATED ORAL at 05:17

## 2023-06-06 RX ADMIN — INSULIN GLARGINE 60 UNIT(S): 100 INJECTION, SOLUTION SUBCUTANEOUS at 21:51

## 2023-06-06 RX ADMIN — OXYCODONE HYDROCHLORIDE 5 MILLIGRAM(S): 5 TABLET ORAL at 11:34

## 2023-06-06 RX ADMIN — CHLORHEXIDINE GLUCONATE 1 APPLICATION(S): 213 SOLUTION TOPICAL at 05:18

## 2023-06-06 RX ADMIN — CEFTRIAXONE 100 MILLIGRAM(S): 500 INJECTION, POWDER, FOR SOLUTION INTRAMUSCULAR; INTRAVENOUS at 20:25

## 2023-06-06 RX ADMIN — Medication 2: at 16:52

## 2023-06-06 RX ADMIN — GABAPENTIN 300 MILLIGRAM(S): 400 CAPSULE ORAL at 21:50

## 2023-06-06 RX ADMIN — DONEPEZIL HYDROCHLORIDE 5 MILLIGRAM(S): 10 TABLET, FILM COATED ORAL at 21:50

## 2023-06-06 RX ADMIN — SENNA PLUS 2 TABLET(S): 8.6 TABLET ORAL at 21:50

## 2023-06-06 RX ADMIN — HEPARIN SODIUM 5000 UNIT(S): 5000 INJECTION INTRAVENOUS; SUBCUTANEOUS at 21:49

## 2023-06-06 RX ADMIN — CLOPIDOGREL BISULFATE 75 MILLIGRAM(S): 75 TABLET, FILM COATED ORAL at 11:13

## 2023-06-06 RX ADMIN — Medication 25 GRAM(S): at 09:27

## 2023-06-06 RX ADMIN — Medication 1000 UNIT(S): at 11:13

## 2023-06-06 RX ADMIN — OXYCODONE HYDROCHLORIDE 5 MILLIGRAM(S): 5 TABLET ORAL at 20:21

## 2023-06-06 RX ADMIN — HEPARIN SODIUM 5000 UNIT(S): 5000 INJECTION INTRAVENOUS; SUBCUTANEOUS at 05:17

## 2023-06-06 RX ADMIN — TAMSULOSIN HYDROCHLORIDE 0.4 MILLIGRAM(S): 0.4 CAPSULE ORAL at 21:50

## 2023-06-06 RX ADMIN — OXYCODONE HYDROCHLORIDE 5 MILLIGRAM(S): 5 TABLET ORAL at 19:51

## 2023-06-06 RX ADMIN — Medication 100 MILLIGRAM(S): at 05:17

## 2023-06-06 RX ADMIN — Medication 20 MILLIGRAM(S): at 05:17

## 2023-06-06 RX ADMIN — Medication 6: at 12:45

## 2023-06-06 RX ADMIN — Medication 300 MILLIGRAM(S): at 11:12

## 2023-06-06 RX ADMIN — HEPARIN SODIUM 5000 UNIT(S): 5000 INJECTION INTRAVENOUS; SUBCUTANEOUS at 12:49

## 2023-06-06 RX ADMIN — PANTOPRAZOLE SODIUM 40 MILLIGRAM(S): 20 TABLET, DELAYED RELEASE ORAL at 09:27

## 2023-06-06 NOTE — PROGRESS NOTE ADULT - PROBLEM SELECTOR PLAN 8
- DVT ppx: SQH  - diet: CC, DASH/TLC  - dispo: pending medical course, PT recommending Home PT  - code status: full - DVT ppx: SQH  - diet: CC, DASH/TLC  - dispo: pending medical course  - code status: full    - PT initially recommending home PT, patient with difficulty ambulating today and requiring 3 person assist to stand, requesting re-eval

## 2023-06-06 NOTE — PROGRESS NOTE ADULT - PROBLEM SELECTOR PLAN 4
- bilateral pitting LE edema, possibly venous statis vs HF  - normal pro-BNP but CXR with possible pulmonary edema  - c/w home metolazone 5mg QD  - c/w lasix 20 QD  - f/u TTE - bilateral pitting LE edema, possibly venous statis vs HF  - normal pro-BNP but CXR with possible pulmonary edema  - c/w home metolazone 5mg QD  - c/w lasix 20 QD  - TTE EF 60-65%, fibrocalcific AV w/out stenosis

## 2023-06-06 NOTE — PROGRESS NOTE ADULT - PROBLEM SELECTOR PLAN 2
Patient reporting that he is experiencing urinary incontinence with urinating at times in the bed after urinating normally  - was not on any meds for BPH at home  - will check bladder scan Patient reporting that he is experiencing urinary incontinence with urinating at times in the bed after urinating normally  - questionable hx of taking tamsulosin   - bladder scan with 10 cc post void residual, low c/f obstructive uropathy  - UA neg, low c/f UTI  - d/c atarks   - start tamsulosin 0.4 QD Patient reporting that he is experiencing urinary incontinence with urinating at times in the bed after urinating normally  - questionable hx of taking tamsulosin   - bladder scan with 10 cc post void residual, low c/f obstructive uropathy  - UA neg, low c/f UTI  - d/c atarax    - start tamsulosin 0.4 QD

## 2023-06-06 NOTE — PROGRESS NOTE ADULT - PROBLEM SELECTOR PLAN 5
- continue home ASA and plavix  - f/u lipid profile - continue home ASA and plavix  - per PCP, last stent 2013, will clarify with PCP about switching to monotherapy

## 2023-06-06 NOTE — PROGRESS NOTE ADULT - PROBLEM SELECTOR PLAN 3
- on home 75U Lantus and glyburide-metformin qid  - A1c 8.8 on admission  - c/w lantus 60u (at 80% home dose, increase PRN)  - moderate ISS  - fingersticks tidac & qhs, CC diet

## 2023-06-06 NOTE — PROGRESS NOTE ADULT - PROBLEM SELECTOR PLAN 1
- presenting with 3-5 day hx of RLE erythema, swelling, drainage  - XR without underlying OM, duplex negative for DVTs  - c/w ceftriaxone (6/3- ) x7 days  - MRSA neg  - f/u cultures - presenting with 3-5 day hx of RLE erythema, swelling, drainage  - XR without underlying OM, duplex negative for DVTs  - c/w ceftriaxone (6/3- ) x7 days, switch to keflex on d/c  - MRSA neg

## 2023-06-06 NOTE — PROGRESS NOTE ADULT - PROBLEM SELECTOR PLAN 7
- on home naproxen 500mg qd and tylenol qd  > PRN tylenol and naproxen - on home naproxen 500mg qd and tylenol qd  - PRN tylenol and naproxen

## 2023-06-06 NOTE — PROGRESS NOTE ADULT - SUBJECTIVE AND OBJECTIVE BOX
***************************************************************  Cade Overton, PGY1  Internal Medicine   TEAMS Preferred  ***************************************************************    CHETNA MENDOZA  83y  MRN: [id number]  23 (3d)    Patient is a 83y old  Male who presents with a chief complaint of Cellulitis (2023 18:54)      SUBJECTIVE / OVERNIGHT EVENTS:   No acute overnight events. Pt seen and examined at bedside. Denies fevers, chills, CP, SOB, Abdominal pain, N/V, Constipation, Diarrhea. Last BM     12 Point ROS negative with the exception of the above    MEDICATIONS  (STANDING):  allopurinol 300 milliGRAM(s) Oral daily  aspirin  chewable 81 milliGRAM(s) Oral daily  cefTRIAXone   IVPB 1000 milliGRAM(s) IV Intermittent every 24 hours  chlorhexidine 2% Cloths 1 Application(s) Topical <User Schedule>  cholecalciferol 1000 Unit(s) Oral daily  clopidogrel Tablet 75 milliGRAM(s) Oral daily  dextrose 5%. 1000 milliLiter(s) (100 mL/Hr) IV Continuous <Continuous>  dextrose 5%. 1000 milliLiter(s) (50 mL/Hr) IV Continuous <Continuous>  dextrose 50% Injectable 25 Gram(s) IV Push once  dextrose 50% Injectable 12.5 Gram(s) IV Push once  dextrose 50% Injectable 25 Gram(s) IV Push once  donepezil 5 milliGRAM(s) Oral at bedtime  gabapentin 300 milliGRAM(s) Oral at bedtime  glucagon  Injectable 1 milliGRAM(s) IntraMuscular once  heparin   Injectable 5000 Unit(s) SubCutaneous every 8 hours  hydrOXYzine hydrochloride Syrup 10 milliGRAM(s) Oral daily  insulin glargine Injectable (LANTUS) 60 Unit(s) SubCutaneous at bedtime  insulin lispro (ADMELOG) corrective regimen sliding scale   SubCutaneous at bedtime  insulin lispro (ADMELOG) corrective regimen sliding scale   SubCutaneous three times a day before meals  losartan 100 milliGRAM(s) Oral daily  metolazone 5 milliGRAM(s) Oral daily  metoprolol succinate  milliGRAM(s) Oral daily  pantoprazole    Tablet 40 milliGRAM(s) Oral before breakfast  senna 2 Tablet(s) Oral at bedtime    MEDICATIONS  (PRN):  acetaminophen     Tablet .. 650 milliGRAM(s) Oral every 6 hours PRN Temp greater or equal to 38C (100.4F), Moderate Pain (4 - 6)  dextrose Oral Gel 15 Gram(s) Oral once PRN Blood Glucose LESS THAN 70 milliGRAM(s)/deciliter  oxyCODONE    IR 5 milliGRAM(s) Oral every 6 hours PRN Severe Pain (7 - 10)      OBJECTIVE:  Vital Signs Last 24 Hrs  T(C): 36.9 (2023 04:58), Max: 36.9 (2023 04:58)  T(F): 98.4 (2023 04:58), Max: 98.4 (2023 04:58)  HR: 72 (2023 04:58) (72 - 75)  BP: 137/75 (2023 04:58) (119/71 - 144/69)  BP(mean): --  RR: 18 (2023 04:58) (18 - 18)  SpO2: 92% (2023 04:58) (91% - 92%)    Parameters below as of 2023 04:58  Patient On (Oxygen Delivery Method): nasal cannula  O2 Flow (L/min): 2      I&O's Summary    2023 07:01  -  2023 07:00  --------------------------------------------------------  IN: 970 mL / OUT: 1100 mL / NET: -130 mL        PHYSICAL EXAM:  GENERAL: Laying comfortably, NAD  HEENT: NCAT, PERRLA, EOMI, no scleral icterus, no LAD  NECK: No JVD, supple  LUNG: CTABL; No wheezes, crackles, or rhonchi  HEART: RRR; normal S1/S2; No murmurs, rubs, or gallops  ABDOMEN: +BS, soft, nontender, nondistended, no HSM; No rebound, guarding, or rigidity  EXTREMITIES:  No LE edema b/l, 2+ Peripheral Pulses, No clubbing or cyanosis  NEUROLOGY: AOx3, non-focal, strength 5/5 in all extremities, sensation intact  PSYCH: calm and cooperative  SKIN: No rashes or lesions    LABS:                        12.4   7.12  )-----------( 248      ( 2023 06:01 )             36.3     Auto Eosinophil # x     / Auto Eosinophil % x     / Auto Neutrophil # x     / Auto Neutrophil % x     / BANDS % x                            12.0   7.93  )-----------( 247      ( 2023 06:50 )             36.4     Auto Eosinophil # 0.36  / Auto Eosinophil % 4.5   / Auto Neutrophil # 4.33  / Auto Neutrophil % 54.6  / BANDS % x        06-06    139  |  99  |  21  ----------------------------<  142<H>  3.6   |  26  |  0.84  06-05    140  |  99  |  13  ----------------------------<  106<H>  3.2<L>   |  27  |  0.82  06-04    139  |  98  |  15  ----------------------------<  110<H>  3.7   |  26  |  0.68    Ca    8.9      2023 05:48  Ca    9.1      2023 06:00  Ca    9.0      2023 06:50  Phos  3.5     06-06  Mg     1.6     06-06    TPro  7.0  /  Alb  3.8  /  TBili  0.3  /  DBili  x   /  AST  20  /  ALT  25  /  AlkPhos  99  06-05  TPro  7.0  /  Alb  3.9  /  TBili  0.3  /  DBili  x   /  AST  24  /  ALT  28  /  AlkPhos  95  06-04  TPro  7.8  /  Alb  4.4  /  TBili  0.2  /  DBili  x   /  AST  28  /  ALT  33  /  AlkPhos  107  06-03          Urinalysis Basic - ( 2023 18:57 )    Color: Light Yellow / Appearance: Clear / S.022 / pH: x  Gluc: x / Ketone: Negative  / Bili: Negative / Urobili: Negative   Blood: x / Protein: Trace / Nitrite: Negative   Leuk Esterase: Negative / RBC: 1 /hpf / WBC 1 /HPF   Sq Epi: x / Non Sq Epi: x / Bacteria: Negative          CAPILLARY BLOOD GLUCOSE      POCT Blood Glucose.: 134 mg/dL (2023 22:08)  POCT Blood Glucose.: 142 mg/dL (2023 16:39)  POCT Blood Glucose.: 304 mg/dL (2023 12:33)  POCT Blood Glucose.: 114 mg/dL (2023 08:42)        RADIOLOGY & ADDITIONAL TESTS:     ***************************************************************  Cade Overton, PGY1  Internal Medicine   TEAMS Preferred  ***************************************************************    CHETNA MENDOZA  83y  MRN: [id number]  23 (3d)    Patient is a 83y old  Male who presents with a chief complaint of Cellulitis (2023 18:54)      SUBJECTIVE / OVERNIGHT EVENTS:   [facility]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 Pt seen and examined at bedside. Denies fevers, chills, CP, SOB, Abdominal pain, N/V, Constipation, Diarrhea. Reports continued urinary incontinence. Reports continued 5/10 RLE edema.     12 Point ROS negative with the exception of the above    MEDICATIONS  (STANDING):  allopurinol 300 milliGRAM(s) Oral daily  aspirin  chewable 81 milliGRAM(s) Oral daily  cefTRIAXone   IVPB 1000 milliGRAM(s) IV Intermittent every 24 hours  chlorhexidine 2% Cloths 1 Application(s) Topical <User Schedule>  cholecalciferol 1000 Unit(s) Oral daily  clopidogrel Tablet 75 milliGRAM(s) Oral daily  dextrose 5%. 1000 milliLiter(s) (100 mL/Hr) IV Continuous <Continuous>  dextrose 5%. 1000 milliLiter(s) (50 mL/Hr) IV Continuous <Continuous>  dextrose 50% Injectable 25 Gram(s) IV Push once  dextrose 50% Injectable 12.5 Gram(s) IV Push once  dextrose 50% Injectable 25 Gram(s) IV Push once  donepezil 5 milliGRAM(s) Oral at bedtime  gabapentin 300 milliGRAM(s) Oral at bedtime  glucagon  Injectable 1 milliGRAM(s) IntraMuscular once  heparin   Injectable 5000 Unit(s) SubCutaneous every 8 hours  hydrOXYzine hydrochloride Syrup 10 milliGRAM(s) Oral daily  insulin glargine Injectable (LANTUS) 60 Unit(s) SubCutaneous at bedtime  insulin lispro (ADMELOG) corrective regimen sliding scale   SubCutaneous at bedtime  insulin lispro (ADMELOG) corrective regimen sliding scale   SubCutaneous three times a day before meals  losartan 100 milliGRAM(s) Oral daily  metolazone 5 milliGRAM(s) Oral daily  metoprolol succinate  milliGRAM(s) Oral daily  pantoprazole    Tablet 40 milliGRAM(s) Oral before breakfast  senna 2 Tablet(s) Oral at bedtime    MEDICATIONS  (PRN):  acetaminophen     Tablet .. 650 milliGRAM(s) Oral every 6 hours PRN Temp greater or equal to 38C (100.4F), Moderate Pain (4 - 6)  dextrose Oral Gel 15 Gram(s) Oral once PRN Blood Glucose LESS THAN 70 milliGRAM(s)/deciliter  oxyCODONE    IR 5 milliGRAM(s) Oral every 6 hours PRN Severe Pain (7 - 10)      OBJECTIVE:  Vital Signs Last 24 Hrs  T(C): 36.9 (2023 04:58), Max: 36.9 (2023 04:58)  T(F): 98.4 (2023 04:58), Max: 98.4 (2023 04:58)  HR: 72 (2023 04:58) (72 - 75)  BP: 137/75 (2023 04:58) (119/71 - 144/69)  BP(mean): --  RR: 18 (2023 04:58) (18 - 18)  SpO2: 92% (2023 04:58) (91% - 92%)    Parameters below as of 2023 04:58  Patient On (Oxygen Delivery Method): nasal cannula  O2 Flow (L/min): 2      I&O's Summary    2023 07:01  -  2023 07:00  --------------------------------------------------------  IN: 970 mL / OUT: 1100 mL / NET: -130 mL        PHYSICAL EXAM:  GENERAL: Laying comfortably, NAD  HEENT: NCAT, PERRLA, EOMI, no scleral icterus, no LAD  NECK: No JVD, supple  LUNG: CTABL; No wheezes, crackles, or rhonchi  HEART: RRR; normal S1/S2; No murmurs, rubs, or gallops  ABDOMEN: +BS, soft, nontender, nondistended, no HSM; No rebound, guarding, or rigidity  EXTREMITIES:  No LE edema b/l, 2+ Peripheral Pulses, No clubbing or cyanosis  NEUROLOGY: AOx3, non-focal, strength 5/5 in all extremities, sensation intact  PSYCH: calm and cooperative  SKIN: No rashes or lesions    LABS:                        12.4   7.12  )-----------( 248      ( 2023 06:01 )             36.3     Auto Eosinophil # x     / Auto Eosinophil % x     / Auto Neutrophil # x     / Auto Neutrophil % x     / BANDS % x                            12.0   7.93  )-----------( 247      ( 2023 06:50 )             36.4     Auto Eosinophil # 0.36  / Auto Eosinophil % 4.5   / Auto Neutrophil # 4.33  / Auto Neutrophil % 54.6  / BANDS % x        06-06    139  |  99  |  21  ----------------------------<  142<H>  3.6   |  26  |  0.84  06-05    140  |  99  |  13  ----------------------------<  106<H>  3.2<L>   |  27  |  0.82  06-04    139  |  98  |  15  ----------------------------<  110<H>  3.7   |  26  |  0.68    Ca    8.9      2023 05:48  Ca    9.1      2023 06:00  Ca    9.0      2023 06:50  Phos  3.5     06-06  Mg     1.6     06-06    TPro  7.0  /  Alb  3.8  /  TBili  0.3  /  DBili  x   /  AST  20  /  ALT  25  /  AlkPhos  99  06-05  TPro  7.0  /  Alb  3.9  /  TBili  0.3  /  DBili  x   /  AST  24  /  ALT  28  /  AlkPhos  95  06-04  TPro  7.8  /  Alb  4.4  /  TBili  0.2  /  DBili  x   /  AST  28  /  ALT  33  /  AlkPhos  107  06-03          Urinalysis Basic - ( 2023 18:57 )    Color: Light Yellow / Appearance: Clear / S.022 / pH: x  Gluc: x / Ketone: Negative  / Bili: Negative / Urobili: Negative   Blood: x / Protein: Trace / Nitrite: Negative   Leuk Esterase: Negative / RBC: 1 /hpf / WBC 1 /HPF   Sq Epi: x / Non Sq Epi: x / Bacteria: Negative          CAPILLARY BLOOD GLUCOSE      POCT Blood Glucose.: 134 mg/dL (2023 22:08)  POCT Blood Glucose.: 142 mg/dL (2023 16:39)  POCT Blood Glucose.: 304 mg/dL (2023 12:33)  POCT Blood Glucose.: 114 mg/dL (2023 08:42)        RADIOLOGY & ADDITIONAL TESTS:     ***************************************************************  Cade Overton, PGY1  Internal Medicine   TEAMS Preferred  ***************************************************************    CHETNA MENDOZA  83y  MRN: [id number]  23 (3d)    Patient is a 83y old  Male who presents with a chief complaint of Cellulitis (2023 18:54)      SUBJECTIVE / OVERNIGHT EVENTS:   [facility]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 Pt seen and examined at bedside. Denies fevers, chills, CP, SOB, Abdominal pain, N/V, Constipation, Diarrhea. Reports urinary incontinence at night. Reports continued 5/10 RLE edema.     12 Point ROS negative with the exception of the above    MEDICATIONS  (STANDING):  allopurinol 300 milliGRAM(s) Oral daily  aspirin  chewable 81 milliGRAM(s) Oral daily  cefTRIAXone   IVPB 1000 milliGRAM(s) IV Intermittent every 24 hours  chlorhexidine 2% Cloths 1 Application(s) Topical <User Schedule>  cholecalciferol 1000 Unit(s) Oral daily  clopidogrel Tablet 75 milliGRAM(s) Oral daily  dextrose 5%. 1000 milliLiter(s) (100 mL/Hr) IV Continuous <Continuous>  dextrose 5%. 1000 milliLiter(s) (50 mL/Hr) IV Continuous <Continuous>  dextrose 50% Injectable 25 Gram(s) IV Push once  dextrose 50% Injectable 12.5 Gram(s) IV Push once  dextrose 50% Injectable 25 Gram(s) IV Push once  donepezil 5 milliGRAM(s) Oral at bedtime  gabapentin 300 milliGRAM(s) Oral at bedtime  glucagon  Injectable 1 milliGRAM(s) IntraMuscular once  heparin   Injectable 5000 Unit(s) SubCutaneous every 8 hours  hydrOXYzine hydrochloride Syrup 10 milliGRAM(s) Oral daily  insulin glargine Injectable (LANTUS) 60 Unit(s) SubCutaneous at bedtime  insulin lispro (ADMELOG) corrective regimen sliding scale   SubCutaneous at bedtime  insulin lispro (ADMELOG) corrective regimen sliding scale   SubCutaneous three times a day before meals  losartan 100 milliGRAM(s) Oral daily  metolazone 5 milliGRAM(s) Oral daily  metoprolol succinate  milliGRAM(s) Oral daily  pantoprazole    Tablet 40 milliGRAM(s) Oral before breakfast  senna 2 Tablet(s) Oral at bedtime    MEDICATIONS  (PRN):  acetaminophen     Tablet .. 650 milliGRAM(s) Oral every 6 hours PRN Temp greater or equal to 38C (100.4F), Moderate Pain (4 - 6)  dextrose Oral Gel 15 Gram(s) Oral once PRN Blood Glucose LESS THAN 70 milliGRAM(s)/deciliter  oxyCODONE    IR 5 milliGRAM(s) Oral every 6 hours PRN Severe Pain (7 - 10)      OBJECTIVE:  Vital Signs Last 24 Hrs  T(C): 36.9 (2023 04:58), Max: 36.9 (2023 04:58)  T(F): 98.4 (2023 04:58), Max: 98.4 (2023 04:58)  HR: 72 (2023 04:58) (72 - 75)  BP: 137/75 (2023 04:58) (119/71 - 144/69)  BP(mean): --  RR: 18 (2023 04:58) (18 - 18)  SpO2: 92% (2023 04:58) (91% - 92%)    Parameters below as of 2023 04:58  Patient On (Oxygen Delivery Method): nasal cannula  O2 Flow (L/min): 2      I&O's Summary    2023 07:01  -  2023 07:00  --------------------------------------------------------  IN: 970 mL / OUT: 1100 mL / NET: -130 mL        PHYSICAL EXAM:  GENERAL: Laying comfortably, NAD, obese  HEENT: NCAT, PERRLA, EOMI, no scleral icterus, no LAD  LUNG: CTABL; No wheezes, crackles, or rhonchi  HEART: RRR; normal S1/S2; No murmurs, rubs, or gallops  ABDOMEN: +BS, soft, nontender, nondistended  EXTREMITIES:  b/l LE edema R>L, wrapped in bandages  NEUROLOGY: AOx3, non-focal, strength 5/5 in all extremities, sensation intact  PSYCH: calm and cooperative  SKIN: No rashes or lesions      LABS:                         11.9   8.72  )-----------( 260      ( 2023 05:48 )             35.8                        12.4   7.12  )-----------( 248      ( 2023 06:01 )             36.3     Auto Eosinophil # x     / Auto Eosinophil % x     / Auto Neutrophil # x     / Auto Neutrophil % x     / BANDS % x                            12.0   7.93  )-----------( 247      ( 2023 06:50 )             36.4     Auto Eosinophil # 0.36  / Auto Eosinophil % 4.5   / Auto Neutrophil # 4.33  / Auto Neutrophil % 54.6  / BANDS % x        06-06    139  |  99  |  21  ----------------------------<  142<H>  3.6   |  26  |  0.84  06-05    140  |  99  |  13  ----------------------------<  106<H>  3.2<L>   |  27  |  0.82  06-04    139  |  98  |  15  ----------------------------<  110<H>  3.7   |  26  |  0.68    Ca    8.9      2023 05:48  Ca    9.1      2023 06:00  Ca    9.0      2023 06:50  Phos  3.5     06-06  Mg     1.6     06-06    TPro  7.0  /  Alb  3.8  /  TBili  0.3  /  DBili  x   /  AST  20  /  ALT  25  /  AlkPhos  99  06-05  TPro  7.0  /  Alb  3.9  /  TBili  0.3  /  DBili  x   /  AST  24  /  ALT  28  /  AlkPhos  95  06-04  TPro  7.8  /  Alb  4.4  /  TBili  0.2  /  DBili  x   /  AST  28  /  ALT  33  /  AlkPhos  107  06-03          Urinalysis Basic - ( 2023 18:57 )    Color: Light Yellow / Appearance: Clear / S.022 / pH: x  Gluc: x / Ketone: Negative  / Bili: Negative / Urobili: Negative   Blood: x / Protein: Trace / Nitrite: Negative   Leuk Esterase: Negative / RBC: 1 /hpf / WBC 1 /HPF   Sq Epi: x / Non Sq Epi: x / Bacteria: Negative          CAPILLARY BLOOD GLUCOSE      POCT Blood Glucose.: 134 mg/dL (2023 22:08)  POCT Blood Glucose.: 142 mg/dL (2023 16:39)  POCT Blood Glucose.: 304 mg/dL (2023 12:33)  POCT Blood Glucose.: 114 mg/dL (2023 08:42)        RADIOLOGY & ADDITIONAL TESTS:

## 2023-06-07 LAB
ANION GAP SERPL CALC-SCNC: 15 MMOL/L — SIGNIFICANT CHANGE UP (ref 5–17)
BUN SERPL-MCNC: 22 MG/DL — SIGNIFICANT CHANGE UP (ref 7–23)
CALCIUM SERPL-MCNC: 9 MG/DL — SIGNIFICANT CHANGE UP (ref 8.4–10.5)
CHLORIDE SERPL-SCNC: 97 MMOL/L — SIGNIFICANT CHANGE UP (ref 96–108)
CO2 SERPL-SCNC: 26 MMOL/L — SIGNIFICANT CHANGE UP (ref 22–31)
CREAT SERPL-MCNC: 0.78 MG/DL — SIGNIFICANT CHANGE UP (ref 0.5–1.3)
EGFR: 88 ML/MIN/1.73M2 — SIGNIFICANT CHANGE UP
GLUCOSE BLDC GLUCOMTR-MCNC: 239 MG/DL — HIGH (ref 70–99)
GLUCOSE BLDC GLUCOMTR-MCNC: 260 MG/DL — HIGH (ref 70–99)
GLUCOSE BLDC GLUCOMTR-MCNC: 266 MG/DL — HIGH (ref 70–99)
GLUCOSE BLDC GLUCOMTR-MCNC: 353 MG/DL — HIGH (ref 70–99)
GLUCOSE SERPL-MCNC: 139 MG/DL — HIGH (ref 70–99)
HCT VFR BLD CALC: 36.8 % — LOW (ref 39–50)
HGB BLD-MCNC: 12.5 G/DL — LOW (ref 13–17)
MAGNESIUM SERPL-MCNC: 1.5 MG/DL — LOW (ref 1.6–2.6)
MCHC RBC-ENTMCNC: 32.8 PG — SIGNIFICANT CHANGE UP (ref 27–34)
MCHC RBC-ENTMCNC: 34 GM/DL — SIGNIFICANT CHANGE UP (ref 32–36)
MCV RBC AUTO: 96.6 FL — SIGNIFICANT CHANGE UP (ref 80–100)
NRBC # BLD: 0 /100 WBCS — SIGNIFICANT CHANGE UP (ref 0–0)
PHOSPHATE SERPL-MCNC: 3.8 MG/DL — SIGNIFICANT CHANGE UP (ref 2.5–4.5)
PLATELET # BLD AUTO: 265 K/UL — SIGNIFICANT CHANGE UP (ref 150–400)
POTASSIUM SERPL-MCNC: 3.5 MMOL/L — SIGNIFICANT CHANGE UP (ref 3.5–5.3)
POTASSIUM SERPL-SCNC: 3.5 MMOL/L — SIGNIFICANT CHANGE UP (ref 3.5–5.3)
RBC # BLD: 3.81 M/UL — LOW (ref 4.2–5.8)
RBC # FLD: 13.2 % — SIGNIFICANT CHANGE UP (ref 10.3–14.5)
SODIUM SERPL-SCNC: 138 MMOL/L — SIGNIFICANT CHANGE UP (ref 135–145)
WBC # BLD: 9.37 K/UL — SIGNIFICANT CHANGE UP (ref 3.8–10.5)
WBC # FLD AUTO: 9.37 K/UL — SIGNIFICANT CHANGE UP (ref 3.8–10.5)

## 2023-06-07 PROCEDURE — 99233 SBSQ HOSP IP/OBS HIGH 50: CPT | Mod: GC

## 2023-06-07 RX ORDER — MAGNESIUM SULFATE 500 MG/ML
2 VIAL (ML) INJECTION ONCE
Refills: 0 | Status: COMPLETED | OUTPATIENT
Start: 2023-06-07 | End: 2023-06-07

## 2023-06-07 RX ORDER — INSULIN LISPRO 100/ML
4 VIAL (ML) SUBCUTANEOUS
Refills: 0 | Status: DISCONTINUED | OUTPATIENT
Start: 2023-06-07 | End: 2023-06-08

## 2023-06-07 RX ORDER — POLYETHYLENE GLYCOL 3350 17 G/17G
17 POWDER, FOR SOLUTION ORAL ONCE
Refills: 0 | Status: COMPLETED | OUTPATIENT
Start: 2023-06-07 | End: 2023-06-07

## 2023-06-07 RX ADMIN — Medication 300 MILLIGRAM(S): at 11:22

## 2023-06-07 RX ADMIN — DONEPEZIL HYDROCHLORIDE 5 MILLIGRAM(S): 10 TABLET, FILM COATED ORAL at 21:24

## 2023-06-07 RX ADMIN — HEPARIN SODIUM 5000 UNIT(S): 5000 INJECTION INTRAVENOUS; SUBCUTANEOUS at 21:22

## 2023-06-07 RX ADMIN — OXYCODONE HYDROCHLORIDE 5 MILLIGRAM(S): 5 TABLET ORAL at 11:22

## 2023-06-07 RX ADMIN — HEPARIN SODIUM 5000 UNIT(S): 5000 INJECTION INTRAVENOUS; SUBCUTANEOUS at 05:01

## 2023-06-07 RX ADMIN — CHLORHEXIDINE GLUCONATE 1 APPLICATION(S): 213 SOLUTION TOPICAL at 05:02

## 2023-06-07 RX ADMIN — Medication 81 MILLIGRAM(S): at 11:22

## 2023-06-07 RX ADMIN — Medication 6: at 09:14

## 2023-06-07 RX ADMIN — CEFTRIAXONE 100 MILLIGRAM(S): 500 INJECTION, POWDER, FOR SOLUTION INTRAMUSCULAR; INTRAVENOUS at 21:28

## 2023-06-07 RX ADMIN — TAMSULOSIN HYDROCHLORIDE 0.4 MILLIGRAM(S): 0.4 CAPSULE ORAL at 21:23

## 2023-06-07 RX ADMIN — GABAPENTIN 300 MILLIGRAM(S): 400 CAPSULE ORAL at 21:23

## 2023-06-07 RX ADMIN — Medication 2: at 21:26

## 2023-06-07 RX ADMIN — Medication 1000 UNIT(S): at 11:21

## 2023-06-07 RX ADMIN — LOSARTAN POTASSIUM 100 MILLIGRAM(S): 100 TABLET, FILM COATED ORAL at 05:01

## 2023-06-07 RX ADMIN — Medication 10: at 12:19

## 2023-06-07 RX ADMIN — INSULIN GLARGINE 60 UNIT(S): 100 INJECTION, SOLUTION SUBCUTANEOUS at 21:24

## 2023-06-07 RX ADMIN — OXYCODONE HYDROCHLORIDE 5 MILLIGRAM(S): 5 TABLET ORAL at 12:26

## 2023-06-07 RX ADMIN — Medication 25 GRAM(S): at 09:15

## 2023-06-07 RX ADMIN — POLYETHYLENE GLYCOL 3350 17 GRAM(S): 17 POWDER, FOR SOLUTION ORAL at 12:20

## 2023-06-07 RX ADMIN — Medication 4: at 16:48

## 2023-06-07 RX ADMIN — PANTOPRAZOLE SODIUM 40 MILLIGRAM(S): 20 TABLET, DELAYED RELEASE ORAL at 09:14

## 2023-06-07 RX ADMIN — Medication 100 MILLIGRAM(S): at 05:01

## 2023-06-07 RX ADMIN — HEPARIN SODIUM 5000 UNIT(S): 5000 INJECTION INTRAVENOUS; SUBCUTANEOUS at 13:30

## 2023-06-07 RX ADMIN — SENNA PLUS 2 TABLET(S): 8.6 TABLET ORAL at 21:23

## 2023-06-07 NOTE — PROGRESS NOTE ADULT - PROBLEM SELECTOR PLAN 5
- continue home ASA and plavix  - per PCP, last stent 2013, will clarify with PCP about switching to monotherapy At home on DAPT  - per PCP, last stent 2013  - d/c plavix, switch to ASA monotherapy

## 2023-06-07 NOTE — PROGRESS NOTE ADULT - SUBJECTIVE AND OBJECTIVE BOX
***************************************************************  Cade Overton, PGY1  Internal Medicine   TEAMS Preferred  ***************************************************************    CHETNA MENDOZA  83y  MRN: [id number]  23 (4d)    Patient is a 83y old  Male who presents with a chief complaint of Cellulitis (2023 18:54)      SUBJECTIVE / OVERNIGHT EVENTS:   No acute overnight events. Pt seen and examined at bedside. Denies fevers, chills, CP, SOB, Abdominal pain, N/V, Constipation, Diarrhea. Last BM     12 Point ROS negative with the exception of the above    MEDICATIONS  (STANDING):  allopurinol 300 milliGRAM(s) Oral daily  aspirin  chewable 81 milliGRAM(s) Oral daily  cefTRIAXone   IVPB 1000 milliGRAM(s) IV Intermittent every 24 hours  chlorhexidine 2% Cloths 1 Application(s) Topical <User Schedule>  cholecalciferol 1000 Unit(s) Oral daily  dextrose 5%. 1000 milliLiter(s) (100 mL/Hr) IV Continuous <Continuous>  dextrose 5%. 1000 milliLiter(s) (50 mL/Hr) IV Continuous <Continuous>  dextrose 50% Injectable 25 Gram(s) IV Push once  dextrose 50% Injectable 12.5 Gram(s) IV Push once  dextrose 50% Injectable 25 Gram(s) IV Push once  donepezil 5 milliGRAM(s) Oral at bedtime  gabapentin 300 milliGRAM(s) Oral at bedtime  glucagon  Injectable 1 milliGRAM(s) IntraMuscular once  heparin   Injectable 5000 Unit(s) SubCutaneous every 8 hours  insulin glargine Injectable (LANTUS) 60 Unit(s) SubCutaneous at bedtime  insulin lispro (ADMELOG) corrective regimen sliding scale   SubCutaneous at bedtime  insulin lispro (ADMELOG) corrective regimen sliding scale   SubCutaneous three times a day before meals  losartan 100 milliGRAM(s) Oral daily  metolazone 5 milliGRAM(s) Oral daily  metoprolol succinate  milliGRAM(s) Oral daily  pantoprazole    Tablet 40 milliGRAM(s) Oral before breakfast  senna 2 Tablet(s) Oral at bedtime  tamsulosin 0.4 milliGRAM(s) Oral at bedtime    MEDICATIONS  (PRN):  acetaminophen     Tablet .. 650 milliGRAM(s) Oral every 6 hours PRN Temp greater or equal to 38C (100.4F), Moderate Pain (4 - 6)  dextrose Oral Gel 15 Gram(s) Oral once PRN Blood Glucose LESS THAN 70 milliGRAM(s)/deciliter  ibuprofen  Tablet. 400 milliGRAM(s) Oral two times a day PRN Mild Pain (1 - 3)  oxyCODONE    IR 5 milliGRAM(s) Oral every 6 hours PRN Severe Pain (7 - 10)      OBJECTIVE:  Vital Signs Last 24 Hrs  T(C): 36.9 (2023 04:28), Max: 37.1 (2023 11:37)  T(F): 98.5 (2023 04:28), Max: 98.8 (2023 20:19)  HR: 77 (2023 04:28) (76 - 90)  BP: 127/73 (2023 04:28) (127/72 - 149/79)  BP(mean): --  RR: 18 (2023 04:28) (18 - 18)  SpO2: 91% (2023 04:28) (90% - 93%)    Parameters below as of 2023 04:28  Patient On (Oxygen Delivery Method): room air        I&O's Summary    2023 07:01  -  2023 06:49  --------------------------------------------------------  IN: 240 mL / OUT: 900 mL / NET: -660 mL        PHYSICAL EXAM:  GENERAL: Laying comfortably, NAD  HEENT: NCAT, PERRLA, EOMI, no scleral icterus, no LAD  NECK: No JVD, supple  LUNG: CTABL; No wheezes, crackles, or rhonchi  HEART: RRR; normal S1/S2; No murmurs, rubs, or gallops  ABDOMEN: +BS, soft, nontender, nondistended, no HSM; No rebound, guarding, or rigidity  EXTREMITIES:  No LE edema b/l, 2+ Peripheral Pulses, No clubbing or cyanosis  NEUROLOGY: AOx3, non-focal, strength 5/5 in all extremities, sensation intact  PSYCH: calm and cooperative  SKIN: No rashes or lesions    LABS:                        12.5   9.37  )-----------( 265      ( 2023 05:39 )             36.8     Auto Eosinophil # x     / Auto Eosinophil % x     / Auto Neutrophil # x     / Auto Neutrophil % x     / BANDS % x                            11.9   8.72  )-----------( 260      ( 2023 05:48 )             35.8     Auto Eosinophil # x     / Auto Eosinophil % x     / Auto Neutrophil # x     / Auto Neutrophil % x     / BANDS % x        06-07    138  |  97  |  22  ----------------------------<  139<H>  3.5   |  26  |  0.78  06-06    139  |  99  |  21  ----------------------------<  142<H>  3.6   |  26  |  0.84  06-05    140  |  99  |  13  ----------------------------<  106<H>  3.2<L>   |  27  |  0.82    Ca    9.0      2023 05:39  Ca    8.9      2023 05:48  Ca    9.1      2023 06:00  Phos  3.8     06-07  Mg     1.5     06-07    TPro  7.0  /  Alb  3.8  /  TBili  0.3  /  DBili  x   /  AST  20  /  ALT  25  /  AlkPhos  99  06-05  TPro  7.0  /  Alb  3.9  /  TBili  0.3  /  DBili  x   /  AST  24  /  ALT  28  /  AlkPhos  95  06-04          Urinalysis Basic - ( 2023 18:57 )    Color: Light Yellow / Appearance: Clear / S.022 / pH: x  Gluc: x / Ketone: Negative  / Bili: Negative / Urobili: Negative   Blood: x / Protein: Trace / Nitrite: Negative   Leuk Esterase: Negative / RBC: 1 /hpf / WBC 1 /HPF   Sq Epi: x / Non Sq Epi: x / Bacteria: Negative          CAPILLARY BLOOD GLUCOSE      POCT Blood Glucose.: 188 mg/dL (2023 20:57)  POCT Blood Glucose.: 166 mg/dL (2023 16:38)  POCT Blood Glucose.: 299 mg/dL (2023 11:43)  POCT Blood Glucose.: 139 mg/dL (2023 07:41)        RADIOLOGY & ADDITIONAL TESTS:     ***************************************************************  Cade Overton, PGY1  Internal Medicine   TEAMS Preferred  ***************************************************************    CHETNA MENDOZA  83y  MRN: [id number]  23 (4d)    Patient is a 83y old  Male who presents with a chief complaint of Cellulitis (2023 18:54)      SUBJECTIVE / OVERNIGHT EVENTS:   [facility]  - [id number] [facility]  No acute overnight events. Pt seen and examined at bedside. Patient initially stating that he wants to leave at 4pm today and does not want to stay in the hospital. Patient additionally stating that he wants his wounds dressed by an MD. Patient's son Oumar was called during the conversation to assist with the discussion. Explained to the patient that due to his worsening weakness/decreased ambulatory capacity, he is recommended to go to subacute rehab. Son helped convince patient to go to [facility]. Additionally explained that his RLE wound and cellulitis is being cared for by a multidisciplinary team including MDs, NPs, and PTs. Patient expressed understanding and agreed to treatment by all staff.    12 Point ROS negative with the exception of the above    MEDICATIONS  (STANDING):  allopurinol 300 milliGRAM(s) Oral daily  aspirin  chewable 81 milliGRAM(s) Oral daily  cefTRIAXone   IVPB 1000 milliGRAM(s) IV Intermittent every 24 hours  chlorhexidine 2% Cloths 1 Application(s) Topical <User Schedule>  cholecalciferol 1000 Unit(s) Oral daily  dextrose 5%. 1000 milliLiter(s) (100 mL/Hr) IV Continuous <Continuous>  dextrose 5%. 1000 milliLiter(s) (50 mL/Hr) IV Continuous <Continuous>  dextrose 50% Injectable 25 Gram(s) IV Push once  dextrose 50% Injectable 12.5 Gram(s) IV Push once  dextrose 50% Injectable 25 Gram(s) IV Push once  donepezil 5 milliGRAM(s) Oral at bedtime  gabapentin 300 milliGRAM(s) Oral at bedtime  glucagon  Injectable 1 milliGRAM(s) IntraMuscular once  heparin   Injectable 5000 Unit(s) SubCutaneous every 8 hours  insulin glargine Injectable (LANTUS) 60 Unit(s) SubCutaneous at bedtime  insulin lispro (ADMELOG) corrective regimen sliding scale   SubCutaneous at bedtime  insulin lispro (ADMELOG) corrective regimen sliding scale   SubCutaneous three times a day before meals  losartan 100 milliGRAM(s) Oral daily  metolazone 5 milliGRAM(s) Oral daily  metoprolol succinate  milliGRAM(s) Oral daily  pantoprazole    Tablet 40 milliGRAM(s) Oral before breakfast  senna 2 Tablet(s) Oral at bedtime  tamsulosin 0.4 milliGRAM(s) Oral at bedtime    MEDICATIONS  (PRN):  acetaminophen     Tablet .. 650 milliGRAM(s) Oral every 6 hours PRN Temp greater or equal to 38C (100.4F), Moderate Pain (4 - 6)  dextrose Oral Gel 15 Gram(s) Oral once PRN Blood Glucose LESS THAN 70 milliGRAM(s)/deciliter  ibuprofen  Tablet. 400 milliGRAM(s) Oral two times a day PRN Mild Pain (1 - 3)  oxyCODONE    IR 5 milliGRAM(s) Oral every 6 hours PRN Severe Pain (7 - 10)      OBJECTIVE:  Vital Signs Last 24 Hrs  T(C): 36.9 (2023 04:28), Max: 37.1 (2023 11:37)  T(F): 98.5 (2023 04:28), Max: 98.8 (2023 20:19)  HR: 77 (2023 04:28) (76 - 90)  BP: 127/73 (2023 04:28) (127/72 - 149/79)  BP(mean): --  RR: 18 (2023 04:28) (18 - 18)  SpO2: 91% (2023 04:28) (90% - 93%)    Parameters below as of 2023 04:28  Patient On (Oxygen Delivery Method): room air        I&O's Summary    2023 07: 06:49  --------------------------------------------------------  IN: 240 mL / OUT: 900 mL / NET: -660 mL        PHYSICAL EXAM:  GENERAL: Laying comfortably, NAD, obese  HEENT: NCAT, PERRLA, EOMI, no scleral icterus, no LAD  LUNG: CTABL; No wheezes, crackles, or rhonchi  HEART: RRR; normal S1/S2; No murmurs, rubs, or gallops  ABDOMEN: +BS, soft, nontender, nondistended  EXTREMITIES:  b/l LE edema R>L, wrapped in bandages; under bandage RLE warm and erythematous with central wound  NEUROLOGY: AOx3, non-focal, strength 5/5 in all extremities, sensation intact  PSYCH: calm and cooperative  SKIN: No rashes or lesions    LABS:                        12.5   9.37  )-----------( 265      ( 2023 05:39 )             36.8     Auto Eosinophil # x     / Auto Eosinophil % x     / Auto Neutrophil # x     / Auto Neutrophil % x     / BANDS % x                            11.9   8.72  )-----------( 260      ( 2023 05:48 )             35.8     Auto Eosinophil # x     / Auto Eosinophil % x     / Auto Neutrophil # x     / Auto Neutrophil % x     / BANDS % x            138  |  97  |  22  ----------------------------<  139<H>  3.5   |  26  |  0.78  06-06    139  |  99  |  21  ----------------------------<  142<H>  3.6   |  26  |  0.84  06-05    140  |  99  |  13  ----------------------------<  106<H>  3.2<L>   |  27  |  0.82    Ca    9.0      2023 05:39  Ca    8.9      2023 05:48  Ca    9.1      2023 06:00  Phos  3.8     06-07  Mg     1.5     06-07    TPro  7.0  /  Alb  3.8  /  TBili  0.3  /  DBili  x   /  AST  20  /  ALT  25  /  AlkPhos  99  06-05  TPro  7.0  /  Alb  3.9  /  TBili  0.3  /  DBili  x   /  AST  24  /  ALT  28  /  AlkPhos  95  06-04        Urinalysis Basic - ( 2023 18:57 )  Color: Light Yellow / Appearance: Clear / S.022 / pH: x  Gluc: x / Ketone: Negative  / Bili: Negative / Urobili: Negative   Blood: x / Protein: Trace / Nitrite: Negative   Leuk Esterase: Negative / RBC: 1 /hpf / WBC 1 /HPF   Sq Epi: x / Non Sq Epi: x / Bacteria: Negative          CAPILLARY BLOOD GLUCOSE  POCT Blood Glucose.: 188 mg/dL (2023 20:57)  POCT Blood Glucose.: 166 mg/dL (2023 16:38)  POCT Blood Glucose.: 299 mg/dL (2023 11:43)  POCT Blood Glucose.: 139 mg/dL (2023 07:41)        RADIOLOGY & ADDITIONAL TESTS:

## 2023-06-07 NOTE — PROGRESS NOTE ADULT - PROBLEM SELECTOR PLAN 8
- DVT ppx: SQH  - diet: CC, DASH/TLC  - dispo: pending medical course  - code status: full    - PT initially recommending home PT, patient with difficulty ambulating today and requiring 3 person assist to stand, requesting re-eval - DVT ppx: SQH  - diet: CC, DASH/TLC  - dispo: pending medical course, APOLONIA  - code status: full

## 2023-06-07 NOTE — PROGRESS NOTE ADULT - PROBLEM SELECTOR PLAN 1
- presenting with 3-5 day hx of RLE erythema, swelling, drainage  - XR without underlying OM, duplex negative for DVTs  - c/w ceftriaxone (6/3- ) x7 days, switch to keflex on d/c  - MRSA neg

## 2023-06-07 NOTE — PROGRESS NOTE ADULT - PROBLEM SELECTOR PLAN 4
- bilateral pitting LE edema, possibly venous statis vs HF  - normal pro-BNP but CXR with possible pulmonary edema  - c/w home metolazone 5mg QD  - c/w lasix 20 QD  - TTE EF 60-65%, fibrocalcific AV w/out stenosis

## 2023-06-07 NOTE — PROGRESS NOTE ADULT - PROBLEM SELECTOR PLAN 2
Patient reporting that he is experiencing urinary incontinence with urinating at times in the bed after urinating normally  - questionable hx of taking tamsulosin   - bladder scan with 10 cc post void residual, low c/f obstructive uropathy  - UA neg, low c/f UTI  - d/c atarax    - start tamsulosin 0.4 QD Patient reporting that he is experiencing urinary incontinence with urinating at times in the bed after urinating normally  - questionable hx of taking tamsulosin   - bladder scan with 10 cc post void residual, low c/f obstructive uropathy  - UA neg, low c/f UTI  - c/w tamsulosin 0.4 QD

## 2023-06-08 LAB
ANION GAP SERPL CALC-SCNC: 15 MMOL/L — SIGNIFICANT CHANGE UP (ref 5–17)
BUN SERPL-MCNC: 20 MG/DL — SIGNIFICANT CHANGE UP (ref 7–23)
CALCIUM SERPL-MCNC: 9 MG/DL — SIGNIFICANT CHANGE UP (ref 8.4–10.5)
CHLORIDE SERPL-SCNC: 97 MMOL/L — SIGNIFICANT CHANGE UP (ref 96–108)
CO2 SERPL-SCNC: 25 MMOL/L — SIGNIFICANT CHANGE UP (ref 22–31)
CREAT SERPL-MCNC: 0.76 MG/DL — SIGNIFICANT CHANGE UP (ref 0.5–1.3)
EGFR: 89 ML/MIN/1.73M2 — SIGNIFICANT CHANGE UP
GLUCOSE BLDC GLUCOMTR-MCNC: 201 MG/DL — HIGH (ref 70–99)
GLUCOSE BLDC GLUCOMTR-MCNC: 206 MG/DL — HIGH (ref 70–99)
GLUCOSE BLDC GLUCOMTR-MCNC: 246 MG/DL — HIGH (ref 70–99)
GLUCOSE BLDC GLUCOMTR-MCNC: 292 MG/DL — HIGH (ref 70–99)
GLUCOSE BLDC GLUCOMTR-MCNC: 327 MG/DL — HIGH (ref 70–99)
GLUCOSE SERPL-MCNC: 173 MG/DL — HIGH (ref 70–99)
MAGNESIUM SERPL-MCNC: 1.7 MG/DL — SIGNIFICANT CHANGE UP (ref 1.6–2.6)
PHOSPHATE SERPL-MCNC: 3.2 MG/DL — SIGNIFICANT CHANGE UP (ref 2.5–4.5)
POTASSIUM SERPL-MCNC: 3.6 MMOL/L — SIGNIFICANT CHANGE UP (ref 3.5–5.3)
POTASSIUM SERPL-SCNC: 3.6 MMOL/L — SIGNIFICANT CHANGE UP (ref 3.5–5.3)
SARS-COV-2 RNA SPEC QL NAA+PROBE: SIGNIFICANT CHANGE UP
SODIUM SERPL-SCNC: 137 MMOL/L — SIGNIFICANT CHANGE UP (ref 135–145)

## 2023-06-08 PROCEDURE — 99233 SBSQ HOSP IP/OBS HIGH 50: CPT | Mod: GC

## 2023-06-08 RX ORDER — MAGNESIUM SULFATE 500 MG/ML
2 VIAL (ML) INJECTION ONCE
Refills: 0 | Status: COMPLETED | OUTPATIENT
Start: 2023-06-08 | End: 2023-06-08

## 2023-06-08 RX ORDER — POLYETHYLENE GLYCOL 3350 17 G/17G
17 POWDER, FOR SOLUTION ORAL DAILY
Refills: 0 | Status: DISCONTINUED | OUTPATIENT
Start: 2023-06-08 | End: 2023-06-09

## 2023-06-08 RX ORDER — INSULIN GLARGINE 100 [IU]/ML
65 INJECTION, SOLUTION SUBCUTANEOUS AT BEDTIME
Refills: 0 | Status: DISCONTINUED | OUTPATIENT
Start: 2023-06-08 | End: 2023-06-09

## 2023-06-08 RX ORDER — INSULIN LISPRO 100/ML
6 VIAL (ML) SUBCUTANEOUS
Refills: 0 | Status: DISCONTINUED | OUTPATIENT
Start: 2023-06-08 | End: 2023-06-09

## 2023-06-08 RX ORDER — MINERAL OIL
133 OIL (ML) MISCELLANEOUS ONCE
Refills: 0 | Status: COMPLETED | OUTPATIENT
Start: 2023-06-08 | End: 2023-06-09

## 2023-06-08 RX ADMIN — Medication 4 UNIT(S): at 13:08

## 2023-06-08 RX ADMIN — SENNA PLUS 2 TABLET(S): 8.6 TABLET ORAL at 21:08

## 2023-06-08 RX ADMIN — Medication 6 UNIT(S): at 16:59

## 2023-06-08 RX ADMIN — LOSARTAN POTASSIUM 100 MILLIGRAM(S): 100 TABLET, FILM COATED ORAL at 07:23

## 2023-06-08 RX ADMIN — Medication 300 MILLIGRAM(S): at 13:14

## 2023-06-08 RX ADMIN — Medication 81 MILLIGRAM(S): at 13:13

## 2023-06-08 RX ADMIN — Medication 4: at 09:00

## 2023-06-08 RX ADMIN — Medication 25 GRAM(S): at 09:00

## 2023-06-08 RX ADMIN — Medication 100 MILLIGRAM(S): at 07:26

## 2023-06-08 RX ADMIN — Medication 1000 UNIT(S): at 13:13

## 2023-06-08 RX ADMIN — Medication 6: at 13:07

## 2023-06-08 RX ADMIN — HEPARIN SODIUM 5000 UNIT(S): 5000 INJECTION INTRAVENOUS; SUBCUTANEOUS at 07:25

## 2023-06-08 RX ADMIN — DONEPEZIL HYDROCHLORIDE 5 MILLIGRAM(S): 10 TABLET, FILM COATED ORAL at 21:10

## 2023-06-08 RX ADMIN — INSULIN GLARGINE 65 UNIT(S): 100 INJECTION, SOLUTION SUBCUTANEOUS at 21:09

## 2023-06-08 RX ADMIN — POLYETHYLENE GLYCOL 3350 17 GRAM(S): 17 POWDER, FOR SOLUTION ORAL at 16:58

## 2023-06-08 RX ADMIN — Medication 10 MILLIGRAM(S): at 13:14

## 2023-06-08 RX ADMIN — Medication 4 UNIT(S): at 09:00

## 2023-06-08 RX ADMIN — HEPARIN SODIUM 5000 UNIT(S): 5000 INJECTION INTRAVENOUS; SUBCUTANEOUS at 21:08

## 2023-06-08 RX ADMIN — TAMSULOSIN HYDROCHLORIDE 0.4 MILLIGRAM(S): 0.4 CAPSULE ORAL at 21:08

## 2023-06-08 RX ADMIN — PANTOPRAZOLE SODIUM 40 MILLIGRAM(S): 20 TABLET, DELAYED RELEASE ORAL at 09:01

## 2023-06-08 RX ADMIN — CEFTRIAXONE 100 MILLIGRAM(S): 500 INJECTION, POWDER, FOR SOLUTION INTRAMUSCULAR; INTRAVENOUS at 21:10

## 2023-06-08 RX ADMIN — HEPARIN SODIUM 5000 UNIT(S): 5000 INJECTION INTRAVENOUS; SUBCUTANEOUS at 13:08

## 2023-06-08 RX ADMIN — Medication 8: at 16:58

## 2023-06-08 RX ADMIN — GABAPENTIN 300 MILLIGRAM(S): 400 CAPSULE ORAL at 21:08

## 2023-06-08 RX ADMIN — CHLORHEXIDINE GLUCONATE 1 APPLICATION(S): 213 SOLUTION TOPICAL at 07:25

## 2023-06-08 NOTE — PROGRESS NOTE ADULT - PROBLEM SELECTOR PLAN 5
At home on DAPT  - per PCP, last stent 2013  - d/c plavix, switch to ASA monotherapy At home on DAPT  - per PCP, last stent 2013  - c/w ASA monotherapy

## 2023-06-08 NOTE — PROGRESS NOTE ADULT - PROBLEM SELECTOR PLAN 8
- DVT ppx: SQH  - diet: CC, DASH/TLC  - dispo: pending medical course, APOLONIA  - code status: full

## 2023-06-08 NOTE — PROGRESS NOTE ADULT - TIME BILLING
Discussion with patient and wound care team, examining wound, documentation.

## 2023-06-08 NOTE — PROGRESS NOTE ADULT - SUBJECTIVE AND OBJECTIVE BOX
***************************************************************  Cade Overton, PGY1  Internal Medicine   TEAMS Preferred  ***************************************************************    CHETNA MENDOZA  83y  MRN: [id number]  06-03-23 (5d)    Patient is a 83y old  Male who presents with a chief complaint of Cellulitis (04 Jun 2023 18:54)      SUBJECTIVE / OVERNIGHT EVENTS:   No acute overnight events. Pt seen and examined at bedside. Denies fevers, chills, CP, SOB, Abdominal pain, N/V, Constipation, Diarrhea. Last BM     12 Point ROS negative with the exception of the above    MEDICATIONS  (STANDING):  allopurinol 300 milliGRAM(s) Oral daily  aspirin  chewable 81 milliGRAM(s) Oral daily  cefTRIAXone   IVPB 1000 milliGRAM(s) IV Intermittent every 24 hours  chlorhexidine 2% Cloths 1 Application(s) Topical <User Schedule>  cholecalciferol 1000 Unit(s) Oral daily  dextrose 5%. 1000 milliLiter(s) (50 mL/Hr) IV Continuous <Continuous>  dextrose 5%. 1000 milliLiter(s) (100 mL/Hr) IV Continuous <Continuous>  dextrose 50% Injectable 12.5 Gram(s) IV Push once  dextrose 50% Injectable 25 Gram(s) IV Push once  dextrose 50% Injectable 25 Gram(s) IV Push once  donepezil 5 milliGRAM(s) Oral at bedtime  gabapentin 300 milliGRAM(s) Oral at bedtime  glucagon  Injectable 1 milliGRAM(s) IntraMuscular once  heparin   Injectable 5000 Unit(s) SubCutaneous every 8 hours  insulin glargine Injectable (LANTUS) 60 Unit(s) SubCutaneous at bedtime  insulin lispro (ADMELOG) corrective regimen sliding scale   SubCutaneous at bedtime  insulin lispro (ADMELOG) corrective regimen sliding scale   SubCutaneous three times a day before meals  insulin lispro Injectable (ADMELOG) 4 Unit(s) SubCutaneous three times a day before meals  losartan 100 milliGRAM(s) Oral daily  metolazone 5 milliGRAM(s) Oral daily  metoprolol succinate  milliGRAM(s) Oral daily  pantoprazole    Tablet 40 milliGRAM(s) Oral before breakfast  senna 2 Tablet(s) Oral at bedtime  tamsulosin 0.4 milliGRAM(s) Oral at bedtime    MEDICATIONS  (PRN):  acetaminophen     Tablet .. 650 milliGRAM(s) Oral every 6 hours PRN Temp greater or equal to 38C (100.4F), Moderate Pain (4 - 6)  dextrose Oral Gel 15 Gram(s) Oral once PRN Blood Glucose LESS THAN 70 milliGRAM(s)/deciliter  ibuprofen  Tablet. 400 milliGRAM(s) Oral two times a day PRN Mild Pain (1 - 3)  oxyCODONE    IR 5 milliGRAM(s) Oral every 6 hours PRN Severe Pain (7 - 10)      OBJECTIVE:  Vital Signs Last 24 Hrs  T(C): 37 (08 Jun 2023 04:37), Max: 37.2 (07 Jun 2023 20:53)  T(F): 98.6 (08 Jun 2023 04:37), Max: 98.9 (07 Jun 2023 20:53)  HR: 84 (08 Jun 2023 04:37) (73 - 84)  BP: 145/79 (08 Jun 2023 04:37) (116/62 - 157/73)  BP(mean): --  RR: 18 (08 Jun 2023 04:37) (18 - 18)  SpO2: 95% (08 Jun 2023 04:37) (93% - 96%)    Parameters below as of 08 Jun 2023 04:37  Patient On (Oxygen Delivery Method): room air        I&O's Summary    06 Jun 2023 07:01  -  07 Jun 2023 07:00  --------------------------------------------------------  IN: 240 mL / OUT: 900 mL / NET: -660 mL    07 Jun 2023 07:01  -  08 Jun 2023 06:36  --------------------------------------------------------  IN: 600 mL / OUT: 650 mL / NET: -50 mL        PHYSICAL EXAM:  GENERAL: Laying comfortably, NAD  HEENT: NCAT, PERRLA, EOMI, no scleral icterus, no LAD  NECK: No JVD, supple  LUNG: CTABL; No wheezes, crackles, or rhonchi  HEART: RRR; normal S1/S2; No murmurs, rubs, or gallops  ABDOMEN: +BS, soft, nontender, nondistended, no HSM; No rebound, guarding, or rigidity  EXTREMITIES:  No LE edema b/l, 2+ Peripheral Pulses, No clubbing or cyanosis  NEUROLOGY: AOx3, non-focal, strength 5/5 in all extremities, sensation intact  PSYCH: calm and cooperative  SKIN: No rashes or lesions    LABS:                        12.5   9.37  )-----------( 265      ( 07 Jun 2023 05:39 )             36.8     Auto Eosinophil # x     / Auto Eosinophil % x     / Auto Neutrophil # x     / Auto Neutrophil % x     / BANDS % x        06-08    137  |  97  |  20  ----------------------------<  173<H>  3.6   |  25  |  0.76  06-07    138  |  97  |  22  ----------------------------<  139<H>  3.5   |  26  |  0.78  06-06    139  |  99  |  21  ----------------------------<  142<H>  3.6   |  26  |  0.84    Ca    9.0      08 Jun 2023 06:05  Ca    9.0      07 Jun 2023 05:39  Ca    8.9      06 Jun 2023 05:48  Phos  3.2     06-08  Mg     1.7     06-08                  CAPILLARY BLOOD GLUCOSE      POCT Blood Glucose.: 260 mg/dL (07 Jun 2023 20:58)  POCT Blood Glucose.: 239 mg/dL (07 Jun 2023 16:26)  POCT Blood Glucose.: 353 mg/dL (07 Jun 2023 12:07)  POCT Blood Glucose.: 266 mg/dL (07 Jun 2023 08:47)        RADIOLOGY & ADDITIONAL TESTS:     ***************************************************************  Cade Overton, PGY1  Internal Medicine   TEAMS Preferred  ***************************************************************    CHETNA MENDOZA  83y  MRN: [id number]  06-03-23 (5d)    Patient is a 83y old  Male who presents with a chief complaint of Cellulitis (04 Jun 2023 18:54)      SUBJECTIVE / OVERNIGHT EVENTS:   [facility] [place] [id number]  No acute overnight events. Pt seen and examined at bedside. Denies fevers, chills, CP, SOB, Abdominal pain, N/V, Constipation, Diarrhea. Reports improvements in his RLE pain.     12 Point ROS negative with the exception of the above    MEDICATIONS  (STANDING):  allopurinol 300 milliGRAM(s) Oral daily  aspirin  chewable 81 milliGRAM(s) Oral daily  cefTRIAXone   IVPB 1000 milliGRAM(s) IV Intermittent every 24 hours  chlorhexidine 2% Cloths 1 Application(s) Topical <User Schedule>  cholecalciferol 1000 Unit(s) Oral daily  dextrose 5%. 1000 milliLiter(s) (50 mL/Hr) IV Continuous <Continuous>  dextrose 5%. 1000 milliLiter(s) (100 mL/Hr) IV Continuous <Continuous>  dextrose 50% Injectable 12.5 Gram(s) IV Push once  dextrose 50% Injectable 25 Gram(s) IV Push once  dextrose 50% Injectable 25 Gram(s) IV Push once  donepezil 5 milliGRAM(s) Oral at bedtime  gabapentin 300 milliGRAM(s) Oral at bedtime  glucagon  Injectable 1 milliGRAM(s) IntraMuscular once  heparin   Injectable 5000 Unit(s) SubCutaneous every 8 hours  insulin glargine Injectable (LANTUS) 60 Unit(s) SubCutaneous at bedtime  insulin lispro (ADMELOG) corrective regimen sliding scale   SubCutaneous at bedtime  insulin lispro (ADMELOG) corrective regimen sliding scale   SubCutaneous three times a day before meals  insulin lispro Injectable (ADMELOG) 4 Unit(s) SubCutaneous three times a day before meals  losartan 100 milliGRAM(s) Oral daily  metolazone 5 milliGRAM(s) Oral daily  metoprolol succinate  milliGRAM(s) Oral daily  pantoprazole    Tablet 40 milliGRAM(s) Oral before breakfast  senna 2 Tablet(s) Oral at bedtime  tamsulosin 0.4 milliGRAM(s) Oral at bedtime    MEDICATIONS  (PRN):  acetaminophen     Tablet .. 650 milliGRAM(s) Oral every 6 hours PRN Temp greater or equal to 38C (100.4F), Moderate Pain (4 - 6)  dextrose Oral Gel 15 Gram(s) Oral once PRN Blood Glucose LESS THAN 70 milliGRAM(s)/deciliter  ibuprofen  Tablet. 400 milliGRAM(s) Oral two times a day PRN Mild Pain (1 - 3)  oxyCODONE    IR 5 milliGRAM(s) Oral every 6 hours PRN Severe Pain (7 - 10)      OBJECTIVE:  Vital Signs Last 24 Hrs  T(C): 37 (08 Jun 2023 04:37), Max: 37.2 (07 Jun 2023 20:53)  T(F): 98.6 (08 Jun 2023 04:37), Max: 98.9 (07 Jun 2023 20:53)  HR: 84 (08 Jun 2023 04:37) (73 - 84)  BP: 145/79 (08 Jun 2023 04:37) (116/62 - 157/73)  BP(mean): --  RR: 18 (08 Jun 2023 04:37) (18 - 18)  SpO2: 95% (08 Jun 2023 04:37) (93% - 96%)    Parameters below as of 08 Jun 2023 04:37  Patient On (Oxygen Delivery Method): room air        I&O's Summary    06 Jun 2023 07:01  -  07 Jun 2023 07:00  --------------------------------------------------------  IN: 240 mL / OUT: 900 mL / NET: -660 mL    07 Jun 2023 07:01  -  08 Jun 2023 06:36  --------------------------------------------------------  IN: 600 mL / OUT: 650 mL / NET: -50 mL        PHYSICAL EXAM:  GENERAL: Laying comfortably, NAD, obese  HEENT: NCAT, PERRLA, EOMI, no scleral icterus, no LAD  LUNG: CTABL; No wheezes, crackles, or rhonchi  HEART: RRR; normal S1/S2; No murmurs, rubs, or gallops  ABDOMEN: +BS, soft, nontender, nondistended  EXTREMITIES: RLE warm and erythematous with central wound - improving  NEUROLOGY: AOx3, non-focal, strength 5/5 in all extremities, sensation intact  PSYCH: calm and cooperative  SKIN: No rashes or lesions    LABS:                        12.5   9.37  )-----------( 265      ( 07 Jun 2023 05:39 )             36.8     Auto Eosinophil # x     / Auto Eosinophil % x     / Auto Neutrophil # x     / Auto Neutrophil % x     / BANDS % x        06-08    137  |  97  |  20  ----------------------------<  173<H>  3.6   |  25  |  0.76  06-07    138  |  97  |  22  ----------------------------<  139<H>  3.5   |  26  |  0.78  06-06    139  |  99  |  21  ----------------------------<  142<H>  3.6   |  26  |  0.84    Ca    9.0      08 Jun 2023 06:05  Ca    9.0      07 Jun 2023 05:39  Ca    8.9      06 Jun 2023 05:48  Phos  3.2     06-08  Mg     1.7     06-08        CAPILLARY BLOOD GLUCOSE  POCT Blood Glucose.: 260 mg/dL (07 Jun 2023 20:58)  POCT Blood Glucose.: 239 mg/dL (07 Jun 2023 16:26)  POCT Blood Glucose.: 353 mg/dL (07 Jun 2023 12:07)  POCT Blood Glucose.: 266 mg/dL (07 Jun 2023 08:47)        RADIOLOGY & ADDITIONAL TESTS:

## 2023-06-08 NOTE — PHARMACOTHERAPY INTERVENTION NOTE - COMMENTS
84 y/o M with IDDM, HTN, HLD, CAD s/p PCI, gout, OA who presents with RLE cellulitis requiring IV abx.    Current Therapy: Admelog 4 units and Lantus 60 units   Home Therapy: Glyburide-Metformin 5-500 mg 1 tab four times daily and Lantus 75 units at bedtime   HgbA1C: 8.8% (6/4/23)  Diet: Consistent carbohydrate     Findings:  CAPILLARY BLOOD GLUCOSE    POCT Blood Glucose.: 292 mg/dL (08 Jun 2023 11:47)  POCT Blood Glucose.: 201 mg/dL (08 Jun 2023 08:03)  POCT Blood Glucose.: 260 mg/dL (07 Jun 2023 20:58)  POCT Blood Glucose.: 239 mg/dL (07 Jun 2023 16:26)    Recommend to increase admelog 4 units to 6 units three times daily with meals and lantus 60 units to 65 units at bedtime since patient required additional 22 units of sliding scale insulin in the past 24 hours. Continue to monitor blood sugar levels and ISS.    Best regards,   Kimberlee Galvin PharmD  PGY-1 Resident   [facility] [id number]/Teams

## 2023-06-08 NOTE — PROGRESS NOTE ADULT - PROBLEM SELECTOR PLAN 2
Patient reporting that he is experiencing urinary incontinence with urinating at times in the bed after urinating normally  - questionable hx of taking tamsulosin   - bladder scan with 10 cc post void residual, low c/f obstructive uropathy  - UA neg, low c/f UTI  - c/w tamsulosin 0.4 QD

## 2023-06-08 NOTE — PROGRESS NOTE ADULT - PROBLEM SELECTOR PLAN 3
- on home 75U Lantus and glyburide-metformin qid  - A1c 8.8 on admission  - c/w lantus 60u (at 80% home dose, increase PRN)  - moderate ISS  - fingersticks tidac & qhs, CC diet - on home 75U Lantus and glyburide-metformin qid  - A1c 8.8 on admission  - c/w lantus 60u (at 80% home dose, increase PRN)  - c/w admelog 4u pre-meal  - moderate ISS  - fingersticks tidac & qhs, CC diet

## 2023-06-09 ENCOUNTER — TRANSCRIPTION ENCOUNTER (OUTPATIENT)
Age: 83
End: 2023-06-09

## 2023-06-09 VITALS
TEMPERATURE: 98 F | HEART RATE: 72 BPM | OXYGEN SATURATION: 93 % | DIASTOLIC BLOOD PRESSURE: 74 MMHG | RESPIRATION RATE: 18 BRPM | SYSTOLIC BLOOD PRESSURE: 123 MMHG

## 2023-06-09 LAB
ANION GAP SERPL CALC-SCNC: 14 MMOL/L — SIGNIFICANT CHANGE UP (ref 5–17)
BUN SERPL-MCNC: 27 MG/DL — HIGH (ref 7–23)
CALCIUM SERPL-MCNC: 8.9 MG/DL — SIGNIFICANT CHANGE UP (ref 8.4–10.5)
CHLORIDE SERPL-SCNC: 97 MMOL/L — SIGNIFICANT CHANGE UP (ref 96–108)
CO2 SERPL-SCNC: 25 MMOL/L — SIGNIFICANT CHANGE UP (ref 22–31)
CREAT SERPL-MCNC: 0.89 MG/DL — SIGNIFICANT CHANGE UP (ref 0.5–1.3)
EGFR: 85 ML/MIN/1.73M2 — SIGNIFICANT CHANGE UP
GLUCOSE BLDC GLUCOMTR-MCNC: 168 MG/DL — HIGH (ref 70–99)
GLUCOSE BLDC GLUCOMTR-MCNC: 195 MG/DL — HIGH (ref 70–99)
GLUCOSE BLDC GLUCOMTR-MCNC: 315 MG/DL — HIGH (ref 70–99)
GLUCOSE SERPL-MCNC: 147 MG/DL — HIGH (ref 70–99)
MAGNESIUM SERPL-MCNC: 2 MG/DL — SIGNIFICANT CHANGE UP (ref 1.6–2.6)
PHOSPHATE SERPL-MCNC: 3.3 MG/DL — SIGNIFICANT CHANGE UP (ref 2.5–4.5)
POTASSIUM SERPL-MCNC: 3.8 MMOL/L — SIGNIFICANT CHANGE UP (ref 3.5–5.3)
POTASSIUM SERPL-SCNC: 3.8 MMOL/L — SIGNIFICANT CHANGE UP (ref 3.5–5.3)
SODIUM SERPL-SCNC: 136 MMOL/L — SIGNIFICANT CHANGE UP (ref 135–145)

## 2023-06-09 PROCEDURE — 93005 ELECTROCARDIOGRAM TRACING: CPT

## 2023-06-09 PROCEDURE — 87637 SARSCOV2&INF A&B&RSV AMP PRB: CPT

## 2023-06-09 PROCEDURE — 80061 LIPID PANEL: CPT

## 2023-06-09 PROCEDURE — 36415 COLL VENOUS BLD VENIPUNCTURE: CPT

## 2023-06-09 PROCEDURE — C8929: CPT

## 2023-06-09 PROCEDURE — 83880 ASSAY OF NATRIURETIC PEPTIDE: CPT

## 2023-06-09 PROCEDURE — 85025 COMPLETE CBC W/AUTO DIFF WBC: CPT

## 2023-06-09 PROCEDURE — 87640 STAPH A DNA AMP PROBE: CPT

## 2023-06-09 PROCEDURE — 99285 EMERGENCY DEPT VISIT HI MDM: CPT

## 2023-06-09 PROCEDURE — 97530 THERAPEUTIC ACTIVITIES: CPT

## 2023-06-09 PROCEDURE — 82962 GLUCOSE BLOOD TEST: CPT

## 2023-06-09 PROCEDURE — 81001 URINALYSIS AUTO W/SCOPE: CPT

## 2023-06-09 PROCEDURE — 85027 COMPLETE CBC AUTOMATED: CPT

## 2023-06-09 PROCEDURE — 80048 BASIC METABOLIC PNL TOTAL CA: CPT

## 2023-06-09 PROCEDURE — 71045 X-RAY EXAM CHEST 1 VIEW: CPT

## 2023-06-09 PROCEDURE — 97602 WOUND(S) CARE NON-SELECTIVE: CPT

## 2023-06-09 PROCEDURE — 93970 EXTREMITY STUDY: CPT

## 2023-06-09 PROCEDURE — 84100 ASSAY OF PHOSPHORUS: CPT

## 2023-06-09 PROCEDURE — 97162 PT EVAL MOD COMPLEX 30 MIN: CPT

## 2023-06-09 PROCEDURE — 83735 ASSAY OF MAGNESIUM: CPT

## 2023-06-09 PROCEDURE — 96374 THER/PROPH/DIAG INJ IV PUSH: CPT

## 2023-06-09 PROCEDURE — 84484 ASSAY OF TROPONIN QUANT: CPT

## 2023-06-09 PROCEDURE — 99239 HOSP IP/OBS DSCHRG MGMT >30: CPT

## 2023-06-09 PROCEDURE — 83036 HEMOGLOBIN GLYCOSYLATED A1C: CPT

## 2023-06-09 PROCEDURE — 87641 MR-STAPH DNA AMP PROBE: CPT

## 2023-06-09 PROCEDURE — 97116 GAIT TRAINING THERAPY: CPT

## 2023-06-09 PROCEDURE — 73590 X-RAY EXAM OF LOWER LEG: CPT

## 2023-06-09 PROCEDURE — 87635 SARS-COV-2 COVID-19 AMP PRB: CPT

## 2023-06-09 PROCEDURE — 80053 COMPREHEN METABOLIC PANEL: CPT

## 2023-06-09 RX ORDER — METOPROLOL TARTRATE 50 MG
1 TABLET ORAL
Qty: 0 | Refills: 0 | DISCHARGE
Start: 2023-06-09

## 2023-06-09 RX ORDER — INSULIN LISPRO 100/ML
1 VIAL (ML) SUBCUTANEOUS
Qty: 0 | Refills: 0 | DISCHARGE
Start: 2023-06-09

## 2023-06-09 RX ORDER — HYDROXYZINE HCL 10 MG
1 TABLET ORAL
Refills: 0 | DISCHARGE

## 2023-06-09 RX ORDER — ACETAMINOPHEN 500 MG
0 TABLET ORAL
Refills: 0 | DISCHARGE

## 2023-06-09 RX ORDER — OXYCODONE HYDROCHLORIDE 5 MG/1
1 TABLET ORAL
Qty: 0 | Refills: 0 | DISCHARGE
Start: 2023-06-09

## 2023-06-09 RX ORDER — CEPHALEXIN 500 MG
1 CAPSULE ORAL
Qty: 12 | Refills: 0
Start: 2023-06-09 | End: 2023-06-11

## 2023-06-09 RX ORDER — CEPHALEXIN 500 MG
500 CAPSULE ORAL
Refills: 0 | Status: DISCONTINUED | OUTPATIENT
Start: 2023-06-10 | End: 2023-06-09

## 2023-06-09 RX ORDER — GABAPENTIN 400 MG/1
1 CAPSULE ORAL
Refills: 0 | DISCHARGE

## 2023-06-09 RX ORDER — CEPHALEXIN 500 MG
1 CAPSULE ORAL
Qty: 0 | Refills: 0 | DISCHARGE
Start: 2023-06-09

## 2023-06-09 RX ORDER — METOLAZONE 5 MG/1
1 TABLET ORAL
Refills: 0 | DISCHARGE

## 2023-06-09 RX ORDER — LOSARTAN POTASSIUM 100 MG/1
1 TABLET, FILM COATED ORAL
Qty: 0 | Refills: 0 | DISCHARGE
Start: 2023-06-09

## 2023-06-09 RX ORDER — POLYETHYLENE GLYCOL 3350 17 G/17G
17 POWDER, FOR SOLUTION ORAL
Qty: 0 | Refills: 0 | DISCHARGE
Start: 2023-06-09

## 2023-06-09 RX ORDER — GABAPENTIN 400 MG/1
1 CAPSULE ORAL
Qty: 0 | Refills: 0 | DISCHARGE
Start: 2023-06-09

## 2023-06-09 RX ORDER — ALLOPURINOL 300 MG
1 TABLET ORAL
Qty: 0 | Refills: 0 | DISCHARGE
Start: 2023-06-09

## 2023-06-09 RX ORDER — SENNA PLUS 8.6 MG/1
2 TABLET ORAL
Refills: 0 | DISCHARGE

## 2023-06-09 RX ORDER — TAMSULOSIN HYDROCHLORIDE 0.4 MG/1
1 CAPSULE ORAL
Qty: 0 | Refills: 0 | DISCHARGE
Start: 2023-06-09

## 2023-06-09 RX ORDER — SENNA PLUS 8.6 MG/1
2 TABLET ORAL
Qty: 0 | Refills: 0 | DISCHARGE
Start: 2023-06-09

## 2023-06-09 RX ORDER — ALLOPURINOL 300 MG
1 TABLET ORAL
Refills: 0 | DISCHARGE

## 2023-06-09 RX ORDER — METOLAZONE 5 MG/1
1 TABLET ORAL
Qty: 0 | Refills: 0 | DISCHARGE
Start: 2023-06-09

## 2023-06-09 RX ADMIN — POLYETHYLENE GLYCOL 3350 17 GRAM(S): 17 POWDER, FOR SOLUTION ORAL at 12:01

## 2023-06-09 RX ADMIN — Medication 81 MILLIGRAM(S): at 12:01

## 2023-06-09 RX ADMIN — Medication 100 MILLIGRAM(S): at 05:07

## 2023-06-09 RX ADMIN — Medication 6 UNIT(S): at 08:50

## 2023-06-09 RX ADMIN — Medication 2: at 08:13

## 2023-06-09 RX ADMIN — LOSARTAN POTASSIUM 100 MILLIGRAM(S): 100 TABLET, FILM COATED ORAL at 05:07

## 2023-06-09 RX ADMIN — HEPARIN SODIUM 5000 UNIT(S): 5000 INJECTION INTRAVENOUS; SUBCUTANEOUS at 05:07

## 2023-06-09 RX ADMIN — HEPARIN SODIUM 5000 UNIT(S): 5000 INJECTION INTRAVENOUS; SUBCUTANEOUS at 13:29

## 2023-06-09 RX ADMIN — PANTOPRAZOLE SODIUM 40 MILLIGRAM(S): 20 TABLET, DELAYED RELEASE ORAL at 05:07

## 2023-06-09 RX ADMIN — Medication 1000 UNIT(S): at 12:01

## 2023-06-09 RX ADMIN — Medication 6 UNIT(S): at 12:41

## 2023-06-09 RX ADMIN — CHLORHEXIDINE GLUCONATE 1 APPLICATION(S): 213 SOLUTION TOPICAL at 05:07

## 2023-06-09 RX ADMIN — Medication 2: at 16:50

## 2023-06-09 RX ADMIN — Medication 6 UNIT(S): at 16:51

## 2023-06-09 RX ADMIN — Medication 650 MILLIGRAM(S): at 16:26

## 2023-06-09 RX ADMIN — Medication 133 MILLILITER(S): at 12:01

## 2023-06-09 RX ADMIN — Medication 650 MILLIGRAM(S): at 16:54

## 2023-06-09 RX ADMIN — Medication 8: at 12:41

## 2023-06-09 RX ADMIN — Medication 300 MILLIGRAM(S): at 12:01

## 2023-06-09 NOTE — DISCHARGE NOTE NURSING/CASE MANAGEMENT/SOCIAL WORK - NSDCVIVACCINE_GEN_ALL_CORE_FT
Tdap; 15-Nguyễn-2019 18:05; Rajat Billy); [employer]; [id number] (Exp. Date: 12-Mar-2020); IntraMuscular; Deltoid Left.; 0.5 milliLiter(s); VIS (VIS Published: 09-May-2013, VIS Presented: 15-Nguyễn-2019);

## 2023-06-09 NOTE — PROGRESS NOTE ADULT - ATTENDING COMMENTS
83M Pmh DM presents for LE edema, progressive SOB admitted for cellulitis and leg swelling.     Per son by bedside, pt has poor dietary compliance at home.    Continue IV Lasix, Ceftriaxone for cellulitis. PT consult. Echo.
83M Pmh DM presents for LE edema, progressive SOB, admitted for cellulitis and leg swelling. Hyperglycemic, poor dietary compliance at home.    Pt c/o increased R leg pain after Medihoney application, requested that his wound be kept dry. Pt seen with PT wound care 6/6 and 6/7- wound cleaned and dry dressing placed.     Wound examined today- improving, dry.     No BM for 6 days- increase bowel regimen.     Rehab if continues to improve.
83M Pmh DM presents for LE edema, progressive SOB, admitted for cellulitis and leg swelling. Hyperglycemic, poor dietary compliance at home.    Pt c/o increased R leg pain after Medihoney application, requested that his wound be kept dry. Pt seen with PT wound care 6/6 and 6/7- wound cleaned and dry dressing placed.     Wound examined today- improving, dry. Keep clean and dry     Intermittently hyperglycemic- resume home regimen.     D/c to rehab w f/u. D/c time 45 mins.
83M Pmh DM presents for LE edema, progressive SOB, admitted for cellulitis and leg swelling. Hyperglycemic, poor dietary compliance at home.    Pt c/o increased R leg pain after Medihoney application, requested that his wound be kept dry. Pt seen with PT wound care yesterday- wound cleaned and dry dressing placed.     Seen again with wound care today- wound cleaned, leave open to air for now, reassess this afternoon.     Hyperglycemic today- increase insulin.     Rehab recommended by PT. Will transition to po abx if improved.
83M Pmh DM presents for LE edema, progressive SOB, admitted for cellulitis and leg swelling. Hyperglycemic, poor dietary compliance at home.    Pt c/o increased R leg pain today after Medihoney application, requested that his wound be kept dry. Pt seen with PT wound care- wound cleaned and dry dressing placed.     Rehab recommended by PT. Will transition to po abx if continues to improve.
83M Pmh DM presents for LE edema, progressive SOB admitted for cellulitis and leg swelling.     Poor dietary compliance at home.    Cellulitis and edema improving. F/u echo.

## 2023-06-09 NOTE — DISCHARGE NOTE NURSING/CASE MANAGEMENT/SOCIAL WORK - PATIENT PORTAL LINK FT
You can access the FollowMyHealth Patient Portal offered by [facility] by registering at the following website: [email or web address]. By joining [employer]’s FollowMyHealth portal, you will also be able to view your health information using other applications (apps) compatible with our system.

## 2023-06-09 NOTE — PROGRESS NOTE ADULT - ASSESSMENT
83 Farsi speaking M with IDDM, HTN, HLD, CAD s/p PCI, gout, OA who presents with RLE cellulitis requiring IV abx.

## 2023-06-09 NOTE — DISCHARGE NOTE NURSING/CASE MANAGEMENT/SOCIAL WORK - NSDCFUADDAPPT_GEN_ALL_CORE_FT
APPTS ARE READY TO BE MADE: [X] YES    Best Family or Patient Contact (if needed):    Additional Information about above appointments (if needed):    1:   2:   3:

## 2023-06-09 NOTE — PROGRESS NOTE ADULT - PROBLEM SELECTOR PLAN 1
- presenting with 3-5 day hx of RLE erythema, swelling, drainage  - XR without underlying OM, duplex negative for DVTs  - c/w ceftriaxone (6/3- ) x7 days, switch to keflex on d/c  - MRSA neg - presenting with 3-5 day hx of RLE erythema, swelling, drainage  - XR without underlying OM, duplex negative for DVTs  - c/w ceftriaxone (6/3- ), switch to keflex on d/c  - MRSA neg

## 2023-06-09 NOTE — PROGRESS NOTE ADULT - PROBLEM SELECTOR PLAN 2
Patient reporting that he is experiencing urinary incontinence with urinating at times in the bed after urinating normally  - questionable hx of taking tamsulosin   - bladder scan with 10 cc post void residual, low c/f obstructive uropathy  - UA neg, low c/f UTI  - c/w tamsulosin 0.4 QD Patient reporting that he is experiencing urinary incontinence with urinating at times in the bed after urinating normally  - questionable hx of taking tamsulosin   - bladder scan with 10 cc post void residual, low c/f obstructive uropathy  - UA neg, low c/f UTI  - c/w tamsulosin 0.4 QD  - improving

## 2023-06-09 NOTE — DISCHARGE NOTE NURSING/CASE MANAGEMENT/SOCIAL WORK - NSDCPEFALRISK_GEN_ALL_CORE
For information on Fall & Injury Prevention, visit: [email or web address] OR  [email or web address] OR  https://www.cdc.gov/steadi/patient.html

## 2023-06-09 NOTE — PROGRESS NOTE ADULT - SUBJECTIVE AND OBJECTIVE BOX
***************************************************************  Cade Overton, PGY1  Internal Medicine   TEAMS Preferred  ***************************************************************    CHETNA MENDOZA  83y  MRN: [id number]  06-03-23 (6d)    Patient is a 83y old  Male who presents with a chief complaint of Cellulitis (04 Jun 2023 18:54)      SUBJECTIVE / OVERNIGHT EVENTS:   No acute overnight events. Pt seen and examined at bedside. Denies fevers, chills, CP, SOB, Abdominal pain, N/V, Constipation, Diarrhea. Last BM     12 Point ROS negative with the exception of the above    MEDICATIONS  (STANDING):  allopurinol 300 milliGRAM(s) Oral daily  aspirin  chewable 81 milliGRAM(s) Oral daily  cefTRIAXone   IVPB 1000 milliGRAM(s) IV Intermittent every 24 hours  chlorhexidine 2% Cloths 1 Application(s) Topical <User Schedule>  cholecalciferol 1000 Unit(s) Oral daily  dextrose 5%. 1000 milliLiter(s) (100 mL/Hr) IV Continuous <Continuous>  dextrose 5%. 1000 milliLiter(s) (50 mL/Hr) IV Continuous <Continuous>  dextrose 50% Injectable 25 Gram(s) IV Push once  dextrose 50% Injectable 12.5 Gram(s) IV Push once  dextrose 50% Injectable 25 Gram(s) IV Push once  donepezil 5 milliGRAM(s) Oral at bedtime  gabapentin 300 milliGRAM(s) Oral at bedtime  glucagon  Injectable 1 milliGRAM(s) IntraMuscular once  heparin   Injectable 5000 Unit(s) SubCutaneous every 8 hours  insulin glargine Injectable (LANTUS) 65 Unit(s) SubCutaneous at bedtime  insulin lispro (ADMELOG) corrective regimen sliding scale   SubCutaneous at bedtime  insulin lispro (ADMELOG) corrective regimen sliding scale   SubCutaneous three times a day before meals  insulin lispro Injectable (ADMELOG) 6 Unit(s) SubCutaneous three times a day before meals  losartan 100 milliGRAM(s) Oral daily  metolazone 5 milliGRAM(s) Oral daily  metoprolol succinate  milliGRAM(s) Oral daily  mineral oil enema 133 milliLiter(s) Rectal once  pantoprazole    Tablet 40 milliGRAM(s) Oral before breakfast  polyethylene glycol 3350 17 Gram(s) Oral daily  senna 2 Tablet(s) Oral at bedtime  tamsulosin 0.4 milliGRAM(s) Oral at bedtime    MEDICATIONS  (PRN):  acetaminophen     Tablet .. 650 milliGRAM(s) Oral every 6 hours PRN Temp greater or equal to 38C (100.4F), Moderate Pain (4 - 6)  dextrose Oral Gel 15 Gram(s) Oral once PRN Blood Glucose LESS THAN 70 milliGRAM(s)/deciliter  ibuprofen  Tablet. 400 milliGRAM(s) Oral two times a day PRN Mild Pain (1 - 3)  oxyCODONE    IR 5 milliGRAM(s) Oral every 6 hours PRN Severe Pain (7 - 10)      OBJECTIVE:  Vital Signs Last 24 Hrs  T(C): 36.7 (09 Jun 2023 04:28), Max: 36.9 (08 Jun 2023 11:54)  T(F): 98 (09 Jun 2023 04:28), Max: 98.4 (08 Jun 2023 11:54)  HR: 73 (09 Jun 2023 04:28) (73 - 80)  BP: 150/70 (09 Jun 2023 04:28) (99/58 - 150/70)  BP(mean): --  RR: 18 (09 Jun 2023 04:28) (18 - 18)  SpO2: 93% (09 Jun 2023 04:28) (93% - 96%)    Parameters below as of 09 Jun 2023 04:28  Patient On (Oxygen Delivery Method): room air        I&O's Summary    07 Jun 2023 07:01  -  08 Jun 2023 07:00  --------------------------------------------------------  IN: 600 mL / OUT: 650 mL / NET: -50 mL    08 Jun 2023 07:01  -  09 Jun 2023 06:53  --------------------------------------------------------  IN: 660 mL / OUT: 900 mL / NET: -240 mL        PHYSICAL EXAM:  GENERAL: Laying comfortably, NAD  HEENT: NCAT, PERRLA, EOMI, no scleral icterus, no LAD  NECK: No JVD, supple  LUNG: CTABL; No wheezes, crackles, or rhonchi  HEART: RRR; normal S1/S2; No murmurs, rubs, or gallops  ABDOMEN: +BS, soft, nontender, nondistended, no HSM; No rebound, guarding, or rigidity  EXTREMITIES:  No LE edema b/l, 2+ Peripheral Pulses, No clubbing or cyanosis  NEUROLOGY: AOx3, non-focal, strength 5/5 in all extremities, sensation intact  PSYCH: calm and cooperative  SKIN: No rashes or lesions    LABS:    06-08    137  |  97  |  20  ----------------------------<  173<H>  3.6   |  25  |  0.76  06-07    138  |  97  |  22  ----------------------------<  139<H>  3.5   |  26  |  0.78    Ca    9.0      08 Jun 2023 06:05  Ca    9.0      07 Jun 2023 05:39  Phos  3.2     06-08  Mg     1.7     06-08                  CAPILLARY BLOOD GLUCOSE      POCT Blood Glucose.: 206 mg/dL (08 Jun 2023 20:27)  POCT Blood Glucose.: 246 mg/dL (08 Jun 2023 18:50)  POCT Blood Glucose.: 327 mg/dL (08 Jun 2023 16:18)  POCT Blood Glucose.: 292 mg/dL (08 Jun 2023 11:47)  POCT Blood Glucose.: 201 mg/dL (08 Jun 2023 08:03)        RADIOLOGY & ADDITIONAL TESTS:     ***************************************************************  Cade Overton, PGY1  Internal Medicine   TEAMS Preferred  ***************************************************************    CHETNA MENDOZA  83y  MRN: [id number]  06-03-23 (6d)    Patient is a 83y old  Male who presents with a chief complaint of Cellulitis (04 Jun 2023 18:54)      SUBJECTIVE / OVERNIGHT EVENTS:   Patient declined enemas and suppositories overnight. Patient has not yet had a BM today. Pt seen and examined at bedside. Denies fevers, chills, CP, SOB, Abdominal pain, N/V, Constipation, Diarrhea. Reports continued 4-5/10 RLE pain, improved from prior.     12 Point ROS negative with the exception of the above    MEDICATIONS  (STANDING):  allopurinol 300 milliGRAM(s) Oral daily  aspirin  chewable 81 milliGRAM(s) Oral daily  cefTRIAXone   IVPB 1000 milliGRAM(s) IV Intermittent every 24 hours  chlorhexidine 2% Cloths 1 Application(s) Topical <User Schedule>  cholecalciferol 1000 Unit(s) Oral daily  dextrose 5%. 1000 milliLiter(s) (100 mL/Hr) IV Continuous <Continuous>  dextrose 5%. 1000 milliLiter(s) (50 mL/Hr) IV Continuous <Continuous>  dextrose 50% Injectable 25 Gram(s) IV Push once  dextrose 50% Injectable 12.5 Gram(s) IV Push once  dextrose 50% Injectable 25 Gram(s) IV Push once  donepezil 5 milliGRAM(s) Oral at bedtime  gabapentin 300 milliGRAM(s) Oral at bedtime  glucagon  Injectable 1 milliGRAM(s) IntraMuscular once  heparin   Injectable 5000 Unit(s) SubCutaneous every 8 hours  insulin glargine Injectable (LANTUS) 65 Unit(s) SubCutaneous at bedtime  insulin lispro (ADMELOG) corrective regimen sliding scale   SubCutaneous at bedtime  insulin lispro (ADMELOG) corrective regimen sliding scale   SubCutaneous three times a day before meals  insulin lispro Injectable (ADMELOG) 6 Unit(s) SubCutaneous three times a day before meals  losartan 100 milliGRAM(s) Oral daily  metolazone 5 milliGRAM(s) Oral daily  metoprolol succinate  milliGRAM(s) Oral daily  mineral oil enema 133 milliLiter(s) Rectal once  pantoprazole    Tablet 40 milliGRAM(s) Oral before breakfast  polyethylene glycol 3350 17 Gram(s) Oral daily  senna 2 Tablet(s) Oral at bedtime  tamsulosin 0.4 milliGRAM(s) Oral at bedtime    MEDICATIONS  (PRN):  acetaminophen     Tablet .. 650 milliGRAM(s) Oral every 6 hours PRN Temp greater or equal to 38C (100.4F), Moderate Pain (4 - 6)  dextrose Oral Gel 15 Gram(s) Oral once PRN Blood Glucose LESS THAN 70 milliGRAM(s)/deciliter  ibuprofen  Tablet. 400 milliGRAM(s) Oral two times a day PRN Mild Pain (1 - 3)  oxyCODONE    IR 5 milliGRAM(s) Oral every 6 hours PRN Severe Pain (7 - 10)      OBJECTIVE:  Vital Signs Last 24 Hrs  T(C): 36.7 (09 Jun 2023 04:28), Max: 36.9 (08 Jun 2023 11:54)  T(F): 98 (09 Jun 2023 04:28), Max: 98.4 (08 Jun 2023 11:54)  HR: 73 (09 Jun 2023 04:28) (73 - 80)  BP: 150/70 (09 Jun 2023 04:28) (99/58 - 150/70)  BP(mean): --  RR: 18 (09 Jun 2023 04:28) (18 - 18)  SpO2: 93% (09 Jun 2023 04:28) (93% - 96%)    Parameters below as of 09 Jun 2023 04:28  Patient On (Oxygen Delivery Method): room air        I&O's Summary    07 Jun 2023 07:01  -  08 Jun 2023 07:00  --------------------------------------------------------  IN: 600 mL / OUT: 650 mL / NET: -50 mL    08 Jun 2023 07:01  -  09 Jun 2023 06:53  --------------------------------------------------------  IN: 660 mL / OUT: 900 mL / NET: -240 mL        PHYSICAL EXAM:  GENERAL: Laying comfortably, NAD, obese  HEENT: NCAT, PERRLA, EOMI, no scleral icterus, no LAD  LUNG: CTABL; No wheezes, crackles, or rhonchi  HEART: RRR; normal S1/S2; No murmurs, rubs, or gallops  ABDOMEN: +BS, soft, nontender, nondistended  EXTREMITIES: RLE warm and erythematous with central wound - improving  NEUROLOGY: AOx3, non-focal, strength 5/5 in all extremities, sensation intact  PSYCH: calm and cooperative  SKIN: No rashes or lesions    LABS:  06-09    136  |  97  |  27<H>  ----------------------------<  147<H>  3.8   |  25  |  0.89    Ca    8.9      09 Jun 2023 07:13  Phos  3.3     06-09  Mg     2.0     06-09 06-08    137  |  97  |  20  ----------------------------<  173<H>  3.6   |  25  |  0.76  06-07    138  |  97  |  22  ----------------------------<  139<H>  3.5   |  26  |  0.78    Ca    9.0      08 Jun 2023 06:05  Ca    9.0      07 Jun 2023 05:39  Phos  3.2     06-08  Mg     1.7     06-08      CAPILLARY BLOOD GLUCOSE  POCT Blood Glucose.: 206 mg/dL (08 Jun 2023 20:27)  POCT Blood Glucose.: 246 mg/dL (08 Jun 2023 18:50)  POCT Blood Glucose.: 327 mg/dL (08 Jun 2023 16:18)  POCT Blood Glucose.: 292 mg/dL (08 Jun 2023 11:47)  POCT Blood Glucose.: 201 mg/dL (08 Jun 2023 08:03)

## 2023-06-09 NOTE — PROGRESS NOTE ADULT - PROBLEM SELECTOR PLAN 3
- on home 75U Lantus and glyburide-metformin qid  - A1c 8.8 on admission  - c/w lantus 60u (at 80% home dose, increase PRN)  - c/w admelog 4u pre-meal  - moderate ISS  - fingersticks tidac & qhs, CC diet - on home 75U Lantus and glyburide-metformin qid  - A1c 8.8 on admission  - c/w lantus 65u (at 80% home dose, increase PRN)  - c/w admelog 6u pre-meal  - moderate ISS  - fingersticks tidac & qhs, CC diet

## 2023-11-16 NOTE — ED ADULT NURSE NOTE - NSFALLRISKINTERV_ED_ALL_ED
Communicate fall risk and risk factors to all staff, patient, and family/Provide visual cue: yellow wristband, yellow gown, etc/Reinforce activity limits and safety measures with patient and family/Call bell, personal items and telephone in reach/Instruct patient to call for assistance before getting out of bed/chair/stretcher/Non-slip footwear applied when patient is off stretcher/[place] to call system/Physically safe environment - no spills, clutter or unnecessary equipment/Purposeful Proactive Rounding/Room/bathroom lighting operational, light cord in reach Introduction Text (Please End With A Colon): Discussed surgical excision for diagnostic confirmation, risks/benefits reviewed. Will reschedule for removal. Detail Level: Detailed Size Of Lesion In Cm (Optional): 0 Procedure To Be Performed At Next Visit: Excision

## 2024-01-23 ENCOUNTER — OUTPATIENT (OUTPATIENT)
Dept: OUTPATIENT SERVICES | Facility: HOSPITAL | Age: 84
LOS: 1 days | End: 2024-01-23
Payer: MEDICARE

## 2024-01-23 ENCOUNTER — APPOINTMENT (OUTPATIENT)
Dept: WOUND CARE | Facility: HOSPITAL | Age: 84
End: 2024-01-23
Payer: MEDICARE

## 2024-01-23 DIAGNOSIS — L97.912 NON-PRESSURE CHRONIC ULCER OF UNSPECIFIED PART OF RIGHT LOWER LEG WITH FAT LAYER EXPOSED: ICD-10-CM

## 2024-01-23 DIAGNOSIS — E11.9 TYPE 2 DIABETES MELLITUS W/OUT COMPLICATIONS: ICD-10-CM

## 2024-01-23 DIAGNOSIS — E66.01 MORBID (SEVERE) OBESITY DUE TO EXCESS CALORIES: ICD-10-CM

## 2024-01-23 DIAGNOSIS — L97.519 NON-PRESSURE CHRONIC ULCER OF OTHER PART OF RIGHT FOOT WITH UNSPECIFIED SEVERITY: ICD-10-CM

## 2024-01-23 DIAGNOSIS — S82.899A OTHER FRACTURE OF UNSPECIFIED LOWER LEG, INITIAL ENCOUNTER FOR CLOSED FRACTURE: Chronic | ICD-10-CM

## 2024-01-23 DIAGNOSIS — S99.922A UNSPECIFIED INJURY OF LEFT FOOT, INITIAL ENCOUNTER: ICD-10-CM

## 2024-01-23 DIAGNOSIS — Z98.49 CATARACT EXTRACTION STATUS, UNSPECIFIED EYE: Chronic | ICD-10-CM

## 2024-01-23 DIAGNOSIS — Z95.5 PRESENCE OF CORONARY ANGIOPLASTY IMPLANT AND GRAFT: Chronic | ICD-10-CM

## 2024-01-23 DIAGNOSIS — Z96.60 PRESENCE OF UNSPECIFIED ORTHOPEDIC JOINT IMPLANT: Chronic | ICD-10-CM

## 2024-01-23 DIAGNOSIS — Z79.4 TYPE 2 DIABETES MELLITUS W/OUT COMPLICATIONS: ICD-10-CM

## 2024-01-23 DIAGNOSIS — Z78.9 OTHER SPECIFIED HEALTH STATUS: ICD-10-CM

## 2024-01-23 PROCEDURE — G0463: CPT

## 2024-01-23 PROCEDURE — 99214 OFFICE O/P EST MOD 30 MIN: CPT

## 2024-01-23 RX ORDER — POVIDONE IODINE 10% LIQUID 10 MG/ML
10 LIQUID TOPICAL
Qty: 1 | Refills: 3 | Status: ACTIVE | COMMUNITY
Start: 2024-01-23 | End: 1900-01-01

## 2024-01-25 PROBLEM — Z78.9 CURRENT NON-SMOKER: Status: ACTIVE | Noted: 2024-01-25

## 2024-01-25 PROBLEM — E11.9 DIABETES MELLITUS WITH INSULIN THERAPY: Status: RESOLVED | Noted: 2022-06-23 | Resolved: 2024-01-25

## 2024-01-25 NOTE — HISTORY OF PRESENT ILLNESS
[FreeTextEntry1] : locationneed4  severity  timing/duration  quality h/o drainage diapers, incontinence CAD gout HTN kidney stones  other 84 y/o Farsi speaking male with IDDM, HTN, CAD s/p PCI, gout, OA  prior with several day history of right lower leg swelling, pain and discharge.

## 2024-01-25 NOTE — PHYSICAL EXAM
[JVD] : no jugular venous distention  [Normal Breath Sounds] : Normal breath sounds [Normal Rate and Rhythm] : normal rate and rhythm [0] : left 0 [Ankle Swelling Bilaterally] : bilaterally  [Ankle Swelling (On Exam)] : present [Abdomen Tenderness] : ~T ~M No abdominal tenderness [Skin Ulcer] : ulcer [Alert] : alert [Oriented to Place] : oriented to place [Calm] : calm [de-identified] : nad [place]- htn [de-identified] : aram gale speaking [de-identified] : rom stiff/weak [Please See PDF for [employer]] : Please See PDF for [employer].

## 2024-01-25 NOTE — ASSESSMENT
[FreeTextEntry1] : 82 y/o Farsi speaking male with IDDM, HTN, CAD s/p PCI, gout, OA  prior with several day history of right lower leg swelling, pain and discharge. shallow right ant tib wound s/p blistter/ overload and numerous toe injuries? pressure from shoes s/p swelling? no cellulitis datacomplexity- modlab, xr, old rec, test resultsreview,, visualize imagecptreview previous  risk - mod surgery  suppplies ordered gauze x sorb dynaflex adaptac betadine  nursing/woundcare orders  :

## 2024-01-25 NOTE — DATA REVIEWED
[FreeTextEntry1] : Discharge Date 09-Jun-2023 Admission Date 03-Jun-2023 23:30 Reason for Admission Cellulitis Hospital Course 84 y/o Farsi speaking male with IDDM, HTN, CAD s/p PCI, gout, OA who presented with several day history of right lower leg swelling, pain and discharge.    Patient was started on IV ceftriaxone for cellulitis. XR was negative for  fractures or OM. Duplex was negative for DVTs.    Patient was also diuresed with IV lasix in setting of bilateral leg swelling  and pulmonary edema seen on CXR. TTE showed EF 60-65%, fibrocalcific AV,  negative otherwise.    Patient RLE wound and cellulitis improved with antibiotics and wound care with  frequent dressing changes. Patient will be discharged on keflex for 3 more  days.    Course complicated by hyperglycemia- reported poor dietary compliance at home.  Fingersticks improved with treatment. Patient will need endocrinology follow  up.    PMD and family updated on course.

## 2024-02-01 DIAGNOSIS — E66.01 MORBID (SEVERE) OBESITY DUE TO EXCESS CALORIES: ICD-10-CM

## 2024-02-01 DIAGNOSIS — L97.912 NON-PRESSURE CHRONIC ULCER OF UNSPECIFIED PART OF RIGHT LOWER LEG WITH FAT LAYER EXPOSED: ICD-10-CM

## 2024-02-01 DIAGNOSIS — L97.519 NON-PRESSURE CHRONIC ULCER OF OTHER PART OF RIGHT FOOT WITH UNSPECIFIED SEVERITY: ICD-10-CM

## 2024-02-01 DIAGNOSIS — Z79.4 LONG TERM (CURRENT) USE OF INSULIN: ICD-10-CM

## 2024-02-01 DIAGNOSIS — S99.922A UNSPECIFIED INJURY OF LEFT FOOT, INITIAL ENCOUNTER: ICD-10-CM
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
## 2024-02-01 DIAGNOSIS — Z78.9 OTHER SPECIFIED HEALTH STATUS: ICD-10-CM

## 2024-02-01 DIAGNOSIS — E11.9 TYPE 2 DIABETES MELLITUS WITHOUT COMPLICATIONS: ICD-10-CM

## 2024-02-26 ENCOUNTER — APPOINTMENT (OUTPATIENT)
Dept: WOUND CARE | Facility: HOSPITAL | Age: 84
End: 2024-02-26

## 2024-07-25 ENCOUNTER — TRANSCRIPTION ENCOUNTER (OUTPATIENT)
Age: 84
End: 2024-07-25

## 2024-08-15 ENCOUNTER — APPOINTMENT (OUTPATIENT)
Dept: WOUND CARE | Facility: CLINIC | Age: 84
End: 2024-08-15

## 2024-08-15 DIAGNOSIS — E08.40 DIABETES MELLITUS DUE TO UNDERLYING CONDITION WITH DIABETIC NEUROPATHY, UNSPECIFIED: ICD-10-CM

## 2024-08-15 DIAGNOSIS — L97.912 NON-PRESSURE CHRONIC ULCER OF UNSPECIFIED PART OF RIGHT LOWER LEG WITH FAT LAYER EXPOSED: ICD-10-CM

## 2024-08-15 DIAGNOSIS — L97.919 VARICOSE VEINS OF RIGHT LOWER EXTREMITY WITH ULCER OF UNSPECIFIED SITE: ICD-10-CM

## 2024-08-15 DIAGNOSIS — I83.019 VARICOSE VEINS OF RIGHT LOWER EXTREMITY WITH ULCER OF UNSPECIFIED SITE: ICD-10-CM

## 2024-08-15 PROCEDURE — 11042 DBRDMT SUBQ TIS 1ST 20SQCM/<: CPT

## 2024-08-15 PROCEDURE — 99204 OFFICE O/P NEW MOD 45 MIN: CPT | Mod: 25

## 2024-08-15 NOTE — PLAN
[FreeTextEntry1] : 84M with right medial and lateral ankle wounds  - Afebrile - Right ankle medial malleolus wound to subQ, Right ankle lateral malleolus wound to subQ, no malodor, no pus. no erythema, no signs of infection, venous stasis noted to RLE. No open lesions or acute signs of infection of left foot. - Right foot xray: 5th proximal phalanx lucency, no pain upon palpation. No gas, no OM - Cultures July 2024: MSSA, Morgonella morgani, Citrobacter  - Aspetic debridement to subq and now beyond using a dermal Curette, pt tolerated well.  - Nursing orders given to patient for nursing facility for dressing changes  - c/w aquacel ag daily to right ankle  - RTC in 4 weeks with Dr Aguilar for ankle wounds

## 2024-08-15 NOTE — HISTORY OF PRESENT ILLNESS
[FreeTextEntry1] : 84M was recently admit 3 weeks ago at [facility] for Right ankle wounds, pt grand daughter states that wounds became infected and had maggots. on d/c pt was transferred to rehab at [facility] on discharge. Pt granddaughter states she knows that the rehab is doing daily dressing changes but is unsure with what and is unsure if he is on antibiotics. on d/c from [facility] recommended mupirocin daily. Pt denies n/c/f/sob.

## 2024-09-13 ENCOUNTER — APPOINTMENT (OUTPATIENT)
Dept: WOUND CARE | Facility: HOSPITAL | Age: 84
End: 2024-09-13

## 2024-11-06 ENCOUNTER — APPOINTMENT (OUTPATIENT)
Dept: HOME HEALTH SERVICES | Facility: HOME HEALTH | Age: 84
End: 2024-11-06
Payer: MEDICARE

## 2024-11-06 VITALS
WEIGHT: 240 LBS | TEMPERATURE: 101.3 F | DIASTOLIC BLOOD PRESSURE: 80 MMHG | HEART RATE: 80 BPM | SYSTOLIC BLOOD PRESSURE: 130 MMHG | OXYGEN SATURATION: 95 % | BODY MASS INDEX: 38.57 KG/M2 | HEIGHT: 66 IN

## 2024-11-06 DIAGNOSIS — I83.019 VARICOSE VEINS OF RIGHT LOWER EXTREMITY WITH ULCER OF UNSPECIFIED SITE: ICD-10-CM

## 2024-11-06 DIAGNOSIS — Z71.89 OTHER SPECIFIED COUNSELING: ICD-10-CM

## 2024-11-06 DIAGNOSIS — R05.9 COUGH, UNSPECIFIED: ICD-10-CM

## 2024-11-06 DIAGNOSIS — L97.912 NON-PRESSURE CHRONIC ULCER OF UNSPECIFIED PART OF RIGHT LOWER LEG WITH FAT LAYER EXPOSED: ICD-10-CM

## 2024-11-06 DIAGNOSIS — F02.80 DEMENTIA IN OTHER DISEASES CLASSIFIED ELSEWHERE W/OUT BEHAVIORAL DISTURBANCE: ICD-10-CM

## 2024-11-06 DIAGNOSIS — L97.919 VARICOSE VEINS OF RIGHT LOWER EXTREMITY WITH ULCER OF UNSPECIFIED SITE: ICD-10-CM

## 2024-11-06 DIAGNOSIS — I50.9 HEART FAILURE, UNSPECIFIED: ICD-10-CM

## 2024-11-06 DIAGNOSIS — E08.40 DIABETES MELLITUS DUE TO UNDERLYING CONDITION WITH DIABETIC NEUROPATHY, UNSPECIFIED: ICD-10-CM

## 2024-11-06 DIAGNOSIS — E66.01 MORBID (SEVERE) OBESITY DUE TO EXCESS CALORIES: ICD-10-CM

## 2024-11-06 PROCEDURE — G0506: CPT

## 2024-11-06 PROCEDURE — 99344 HOME/RES VST NEW MOD MDM 60: CPT

## 2024-11-06 PROCEDURE — 99497 ADVNCD CARE PLAN 30 MIN: CPT

## 2024-11-06 RX ORDER — INSULIN GLARGINE 100 [IU]/ML
100 INJECTION, SOLUTION SUBCUTANEOUS DAILY
Qty: 1 | Refills: 0 | Status: ACTIVE | COMMUNITY
Start: 2024-11-06

## 2024-11-06 RX ORDER — INSULIN ASPART 100 [IU]/ML
100 INJECTION, SOLUTION INTRAVENOUS; SUBCUTANEOUS
Refills: 0 | Status: ACTIVE | COMMUNITY
Start: 2024-11-06

## 2024-11-06 RX ORDER — FUROSEMIDE 40 MG/1
40 TABLET ORAL DAILY
Qty: 1 | Refills: 3 | Status: ACTIVE | COMMUNITY
Start: 2024-11-06 | End: 1900-01-01

## 2025-01-07 ENCOUNTER — APPOINTMENT (OUTPATIENT)
Dept: HOME HEALTH SERVICES | Facility: HOME HEALTH | Age: 85
End: 2025-01-07
Payer: MEDICARE

## 2025-01-07 VITALS
OXYGEN SATURATION: 95 % | TEMPERATURE: 98.3 F | SYSTOLIC BLOOD PRESSURE: 120 MMHG | DIASTOLIC BLOOD PRESSURE: 64 MMHG | HEART RATE: 70 BPM

## 2025-01-07 DIAGNOSIS — E08.40 DIABETES MELLITUS DUE TO UNDERLYING CONDITION WITH DIABETIC NEUROPATHY, UNSPECIFIED: ICD-10-CM

## 2025-01-07 DIAGNOSIS — F02.80 DEMENTIA IN OTHER DISEASES CLASSIFIED ELSEWHERE W/OUT BEHAVIORAL DISTURBANCE: ICD-10-CM

## 2025-01-07 DIAGNOSIS — I50.9 HEART FAILURE, UNSPECIFIED: ICD-10-CM

## 2025-01-07 DIAGNOSIS — L97.912 NON-PRESSURE CHRONIC ULCER OF UNSPECIFIED PART OF RIGHT LOWER LEG WITH FAT LAYER EXPOSED: ICD-10-CM

## 2025-01-07 DIAGNOSIS — E66.01 MORBID (SEVERE) OBESITY DUE TO EXCESS CALORIES: ICD-10-CM

## 2025-01-07 PROCEDURE — 99349 HOME/RES VST EST MOD MDM 40: CPT

## 2025-01-07 RX ORDER — IBUPROFEN 200 MG
CAPSULE ORAL
Qty: 3 | Refills: 0 | Status: ACTIVE | COMMUNITY
Start: 2025-01-07 | End: 1900-01-01

## 2025-01-07 RX ORDER — FLASH GLUCOSE SENSOR
KIT MISCELLANEOUS
Qty: 6 | Refills: 4 | Status: ACTIVE | COMMUNITY
Start: 2025-01-07 | End: 1900-01-01

## 2025-01-07 RX ORDER — FLASH GLUCOSE SENSOR
KIT MISCELLANEOUS
Qty: 1 | Refills: 0 | Status: ACTIVE | COMMUNITY
Start: 2025-01-07 | End: 1900-01-01

## 2025-01-10 ENCOUNTER — NON-APPOINTMENT (OUTPATIENT)
Age: 85
End: 2025-01-10

## 2025-01-30 ENCOUNTER — APPOINTMENT (OUTPATIENT)
Dept: HOME HEALTH SERVICES | Facility: HOME HEALTH | Age: 85
End: 2025-01-30

## 2025-01-30 ENCOUNTER — NON-APPOINTMENT (OUTPATIENT)
Age: 85
End: 2025-01-30

## 2025-01-30 VITALS
TEMPERATURE: 98.4 F | HEART RATE: 72 BPM | RESPIRATION RATE: 16 BRPM | OXYGEN SATURATION: 95 % | SYSTOLIC BLOOD PRESSURE: 122 MMHG | DIASTOLIC BLOOD PRESSURE: 66 MMHG

## 2025-02-10 ENCOUNTER — NON-APPOINTMENT (OUTPATIENT)
Age: 85
End: 2025-02-10

## 2025-02-10 DIAGNOSIS — L03.90 CELLULITIS, UNSPECIFIED: ICD-10-CM

## 2025-02-11 ENCOUNTER — NON-APPOINTMENT (OUTPATIENT)
Age: 85
End: 2025-02-11

## 2025-02-11 ENCOUNTER — LABORATORY RESULT (OUTPATIENT)
Age: 85
End: 2025-02-11

## 2025-02-11 RX ORDER — CEPHALEXIN 500 MG/1
500 CAPSULE ORAL 3 TIMES DAILY
Qty: 21 | Refills: 0 | Status: ACTIVE | COMMUNITY
Start: 2025-02-10 | End: 1900-01-01

## 2025-02-12 ENCOUNTER — LABORATORY RESULT (OUTPATIENT)
Age: 85
End: 2025-02-12

## 2025-02-13 ENCOUNTER — APPOINTMENT (OUTPATIENT)
Dept: HOME HEALTH SERVICES | Facility: HOME HEALTH | Age: 85
End: 2025-02-13

## 2025-02-13 VITALS
DIASTOLIC BLOOD PRESSURE: 70 MMHG | OXYGEN SATURATION: 96 % | SYSTOLIC BLOOD PRESSURE: 128 MMHG | TEMPERATURE: 97.4 F | HEART RATE: 76 BPM | RESPIRATION RATE: 16 BRPM

## 2025-02-13 RX ORDER — TRAMADOL HYDROCHLORIDE AND ACETAMINOPHEN 37.5; 325 MG/1; MG/1
37.5-325 TABLET, FILM COATED ORAL
Qty: 14 | Refills: 0 | Status: ACTIVE | COMMUNITY
Start: 2025-02-13 | End: 1900-01-01

## 2025-02-14 RX ORDER — COLLAGENASE SANTYL 250 [ARB'U]/G
250 OINTMENT TOPICAL DAILY
Qty: 1 | Refills: 2 | Status: ACTIVE | COMMUNITY
Start: 2025-02-13 | End: 1900-01-01

## 2025-02-18 ENCOUNTER — NON-APPOINTMENT (OUTPATIENT)
Age: 85
End: 2025-02-18

## 2025-02-20 ENCOUNTER — RX RENEWAL (OUTPATIENT)
Age: 85
End: 2025-02-20

## 2025-02-20 ENCOUNTER — TRANSCRIPTION ENCOUNTER (OUTPATIENT)
Age: 85
End: 2025-02-20

## 2025-03-06 RX ORDER — POTASSIUM CHLORIDE 750 MG/1
10 TABLET, EXTENDED RELEASE ORAL
Qty: 90 | Refills: 3 | Status: ACTIVE | COMMUNITY
Start: 2025-03-06 | End: 1900-01-01

## 2025-03-11 ENCOUNTER — NON-APPOINTMENT (OUTPATIENT)
Age: 85
End: 2025-03-11

## 2025-03-12 ENCOUNTER — TRANSCRIPTION ENCOUNTER (OUTPATIENT)
Age: 85
End: 2025-03-12

## 2025-03-25 ENCOUNTER — NON-APPOINTMENT (OUTPATIENT)
Age: 85
End: 2025-03-25

## 2025-03-31 ENCOUNTER — RX RENEWAL (OUTPATIENT)
Age: 85
End: 2025-03-31

## 2025-03-31 RX ORDER — BLOOD SUGAR DIAGNOSTIC
STRIP MISCELLANEOUS
Qty: 300 | Refills: 3 | Status: ACTIVE | COMMUNITY
Start: 2025-03-31 | End: 1900-01-01

## 2025-04-02 ENCOUNTER — RX RENEWAL (OUTPATIENT)
Age: 85
End: 2025-04-02

## 2025-04-02 ENCOUNTER — NON-APPOINTMENT (OUTPATIENT)
Age: 85
End: 2025-04-02

## 2025-04-02 DIAGNOSIS — L02.214 CUTANEOUS ABSCESS OF GROIN: ICD-10-CM

## 2025-04-02 RX ORDER — MUPIROCIN 20 MG/G
2 OINTMENT TOPICAL
Qty: 1 | Refills: 1 | Status: ACTIVE | COMMUNITY
Start: 2025-04-02 | End: 1900-01-01

## 2025-04-03 ENCOUNTER — NON-APPOINTMENT (OUTPATIENT)
Age: 85
End: 2025-04-03

## 2025-04-03 ENCOUNTER — TRANSCRIPTION ENCOUNTER (OUTPATIENT)
Age: 85
End: 2025-04-03

## 2025-04-03 PROBLEM — B36.9 DERMATITIS FUNGAL: Status: ACTIVE | Noted: 2025-04-03

## 2025-04-03 RX ORDER — MUPIROCIN 20 MG/G
2 OINTMENT TOPICAL
Qty: 2 | Refills: 3 | Status: ACTIVE | COMMUNITY
Start: 2025-04-03 | End: 1900-01-01

## 2025-04-03 RX ORDER — NYSTATIN 100000 [USP'U]/G
100000 CREAM TOPICAL 3 TIMES DAILY
Qty: 1 | Refills: 3 | Status: ACTIVE | COMMUNITY
Start: 2025-04-03 | End: 1900-01-01

## 2025-04-09 ENCOUNTER — APPOINTMENT (OUTPATIENT)
Dept: HOME HEALTH SERVICES | Facility: HOME HEALTH | Age: 85
End: 2025-04-09
Payer: MEDICARE

## 2025-04-09 VITALS
TEMPERATURE: 98.1 F | OXYGEN SATURATION: 95 % | HEART RATE: 68 BPM | SYSTOLIC BLOOD PRESSURE: 126 MMHG | DIASTOLIC BLOOD PRESSURE: 70 MMHG

## 2025-04-09 DIAGNOSIS — E66.01 MORBID (SEVERE) OBESITY DUE TO EXCESS CALORIES: ICD-10-CM

## 2025-04-09 DIAGNOSIS — L97.919 VARICOSE VEINS OF RIGHT LOWER EXTREMITY WITH ULCER OF UNSPECIFIED SITE: ICD-10-CM

## 2025-04-09 DIAGNOSIS — I83.019 VARICOSE VEINS OF RIGHT LOWER EXTREMITY WITH ULCER OF UNSPECIFIED SITE: ICD-10-CM

## 2025-04-09 DIAGNOSIS — L60.3 NAIL DYSTROPHY: ICD-10-CM

## 2025-04-09 DIAGNOSIS — B36.9 SUPERFICIAL MYCOSIS, UNSPECIFIED: ICD-10-CM

## 2025-04-09 DIAGNOSIS — L97.912 NON-PRESSURE CHRONIC ULCER OF UNSPECIFIED PART OF RIGHT LOWER LEG WITH FAT LAYER EXPOSED: ICD-10-CM

## 2025-04-09 DIAGNOSIS — I50.9 HEART FAILURE, UNSPECIFIED: ICD-10-CM

## 2025-04-09 DIAGNOSIS — E78.5 HYPERLIPIDEMIA, UNSPECIFIED: ICD-10-CM

## 2025-04-09 DIAGNOSIS — F02.80 DEMENTIA IN OTHER DISEASES CLASSIFIED ELSEWHERE W/OUT BEHAVIORAL DISTURBANCE: ICD-10-CM

## 2025-04-09 DIAGNOSIS — E08.40 DIABETES MELLITUS DUE TO UNDERLYING CONDITION WITH DIABETIC NEUROPATHY, UNSPECIFIED: ICD-10-CM

## 2025-04-09 PROCEDURE — 99349 HOME/RES VST EST MOD MDM 40: CPT

## 2025-04-11 ENCOUNTER — LABORATORY RESULT (OUTPATIENT)
Age: 85
End: 2025-04-11

## 2025-04-14 ENCOUNTER — NON-APPOINTMENT (OUTPATIENT)
Age: 85
End: 2025-04-14

## 2025-05-19 RX ORDER — FEXOFENADINE HCL 180 MG/1
180 TABLET, FILM COATED ORAL
Qty: 90 | Refills: 0 | Status: ACTIVE | COMMUNITY
Start: 2025-05-19 | End: 1900-01-01

## 2025-06-09 ENCOUNTER — RX RENEWAL (OUTPATIENT)
Age: 85
End: 2025-06-09

## 2025-06-09 RX ORDER — INSULIN GLARGINE 100 [IU]/ML
100 INJECTION, SOLUTION SUBCUTANEOUS
Qty: 30 | Refills: 3 | Status: ACTIVE | COMMUNITY
Start: 2025-06-09 | End: 1900-01-01

## 2025-08-08 ENCOUNTER — APPOINTMENT (OUTPATIENT)
Dept: HOME HEALTH SERVICES | Facility: HOME HEALTH | Age: 85
End: 2025-08-08

## 2025-08-08 ENCOUNTER — APPOINTMENT (OUTPATIENT)
Dept: HOME HEALTH SERVICES | Facility: HOME HEALTH | Age: 85
End: 2025-08-08
Payer: MEDICARE

## 2025-08-08 VITALS
RESPIRATION RATE: 16 BRPM | SYSTOLIC BLOOD PRESSURE: 134 MMHG | OXYGEN SATURATION: 97 % | DIASTOLIC BLOOD PRESSURE: 68 MMHG | TEMPERATURE: 97.9 F | HEART RATE: 57 BPM

## 2025-08-08 VITALS
HEART RATE: 57 BPM | DIASTOLIC BLOOD PRESSURE: 68 MMHG | OXYGEN SATURATION: 97 % | SYSTOLIC BLOOD PRESSURE: 134 MMHG | TEMPERATURE: 97.9 F

## 2025-08-08 DIAGNOSIS — I83.019 VARICOSE VEINS OF RIGHT LOWER EXTREMITY WITH ULCER OF UNSPECIFIED SITE: ICD-10-CM

## 2025-08-08 DIAGNOSIS — L02.214 CUTANEOUS ABSCESS OF GROIN: ICD-10-CM

## 2025-08-08 DIAGNOSIS — I50.9 HEART FAILURE, UNSPECIFIED: ICD-10-CM

## 2025-08-08 DIAGNOSIS — E08.40 DIABETES MELLITUS DUE TO UNDERLYING CONDITION WITH DIABETIC NEUROPATHY, UNSPECIFIED: ICD-10-CM

## 2025-08-08 DIAGNOSIS — F02.80 DEMENTIA IN OTHER DISEASES CLASSIFIED ELSEWHERE W/OUT BEHAVIORAL DISTURBANCE: ICD-10-CM

## 2025-08-08 DIAGNOSIS — L97.912 NON-PRESSURE CHRONIC ULCER OF UNSPECIFIED PART OF RIGHT LOWER LEG WITH FAT LAYER EXPOSED: ICD-10-CM

## 2025-08-08 DIAGNOSIS — L97.919 VARICOSE VEINS OF RIGHT LOWER EXTREMITY WITH ULCER OF UNSPECIFIED SITE: ICD-10-CM

## 2025-08-08 DIAGNOSIS — E66.01 MORBID (SEVERE) OBESITY DUE TO EXCESS CALORIES: ICD-10-CM

## 2025-08-08 PROCEDURE — 99349 HOME/RES VST EST MOD MDM 40: CPT | Mod: 2W

## 2025-08-11 PROBLEM — L02.214 GROIN ABSCESS: Status: RESOLVED | Noted: 2025-04-02 | Resolved: 2025-08-11

## 2025-08-13 LAB
ALBUMIN SERPL ELPH-MCNC: 4.4 G/DL
ALP BLD-CCNC: 103 U/L
ALT SERPL-CCNC: 29 U/L
ANION GAP SERPL CALC-SCNC: 19 MMOL/L
AST SERPL-CCNC: 29 U/L
BILIRUB SERPL-MCNC: 0.2 MG/DL
BUN SERPL-MCNC: 28 MG/DL
CALCIUM SERPL-MCNC: 9.4 MG/DL
CHLORIDE SERPL-SCNC: 100 MMOL/L
CHOLEST SERPL-MCNC: 148 MG/DL
CO2 SERPL-SCNC: 21 MMOL/L
CREAT SERPL-MCNC: 1.05 MG/DL
EGFRCR SERPLBLD CKD-EPI 2021: 70 ML/MIN/1.73M2
GLUCOSE SERPL-MCNC: 222 MG/DL
HCT VFR BLD CALC: 34.5 %
HDLC SERPL-MCNC: 25 MG/DL
HGB BLD-MCNC: 11.3 G/DL
LDLC SERPL-MCNC: 61 MG/DL
MCHC RBC-ENTMCNC: 32.8 G/DL
MCHC RBC-ENTMCNC: 34.9 PG
MCV RBC AUTO: 106.5 FL
NONHDLC SERPL-MCNC: 123 MG/DL
PLATELET # BLD AUTO: 105 K/UL
POTASSIUM SERPL-SCNC: 5.1 MMOL/L
PROT SERPL-MCNC: 7.4 G/DL
RBC # BLD: 3.24 M/UL
RBC # FLD: 14.1 %
SODIUM SERPL-SCNC: 140 MMOL/L
TRIGL SERPL-MCNC: 399 MG/DL
TSH SERPL-ACNC: 1.08 UIU/ML
WBC # FLD AUTO: 8.17 K/UL

## 2025-08-14 ENCOUNTER — LABORATORY RESULT (OUTPATIENT)
Age: 85
End: 2025-08-14

## 2025-08-28 ENCOUNTER — APPOINTMENT (OUTPATIENT)
Dept: HOME HEALTH SERVICES | Facility: HOME HEALTH | Age: 85
End: 2025-08-28
Payer: MEDICARE

## 2025-08-28 VITALS
DIASTOLIC BLOOD PRESSURE: 80 MMHG | HEART RATE: 82 BPM | TEMPERATURE: 97.5 F | SYSTOLIC BLOOD PRESSURE: 170 MMHG | OXYGEN SATURATION: 94 %

## 2025-08-28 DIAGNOSIS — E66.01 MORBID (SEVERE) OBESITY DUE TO EXCESS CALORIES: ICD-10-CM

## 2025-08-28 DIAGNOSIS — L97.919 VARICOSE VEINS OF RIGHT LOWER EXTREMITY WITH ULCER OF UNSPECIFIED SITE: ICD-10-CM

## 2025-08-28 DIAGNOSIS — L97.519 NON-PRESSURE CHRONIC ULCER OF OTHER PART OF RIGHT FOOT WITH UNSPECIFIED SEVERITY: ICD-10-CM

## 2025-08-28 DIAGNOSIS — F02.80 DEMENTIA IN OTHER DISEASES CLASSIFIED ELSEWHERE W/OUT BEHAVIORAL DISTURBANCE: ICD-10-CM

## 2025-08-28 DIAGNOSIS — I50.9 HEART FAILURE, UNSPECIFIED: ICD-10-CM

## 2025-08-28 DIAGNOSIS — L97.912 NON-PRESSURE CHRONIC ULCER OF UNSPECIFIED PART OF RIGHT LOWER LEG WITH FAT LAYER EXPOSED: ICD-10-CM

## 2025-08-28 DIAGNOSIS — I83.019 VARICOSE VEINS OF RIGHT LOWER EXTREMITY WITH ULCER OF UNSPECIFIED SITE: ICD-10-CM

## 2025-08-28 DIAGNOSIS — E08.40 DIABETES MELLITUS DUE TO UNDERLYING CONDITION WITH DIABETIC NEUROPATHY, UNSPECIFIED: ICD-10-CM

## 2025-08-28 PROCEDURE — 99349 HOME/RES VST EST MOD MDM 40: CPT

## 2025-08-30 ENCOUNTER — TRANSCRIPTION ENCOUNTER (OUTPATIENT)
Age: 85
End: 2025-08-30

## 2025-08-31 PROBLEM — L97.519 TOE ULCER, RIGHT: Status: RESOLVED | Noted: 2024-01-23 | Resolved: 2025-08-31

## 2025-09-10 ENCOUNTER — APPOINTMENT (OUTPATIENT)
Dept: HOME HEALTH SERVICES | Facility: HOME HEALTH | Age: 85
End: 2025-09-10

## 2025-09-10 ENCOUNTER — NON-APPOINTMENT (OUTPATIENT)
Age: 85
End: 2025-09-10